# Patient Record
Sex: FEMALE | Race: WHITE | NOT HISPANIC OR LATINO | URBAN - METROPOLITAN AREA
[De-identification: names, ages, dates, MRNs, and addresses within clinical notes are randomized per-mention and may not be internally consistent; named-entity substitution may affect disease eponyms.]

---

## 2017-07-17 ENCOUNTER — INPATIENT (INPATIENT)
Facility: HOSPITAL | Age: 82
LOS: 5 days | Discharge: HOME | DRG: 287 | End: 2017-07-23
Attending: INTERNAL MEDICINE | Admitting: INTERNAL MEDICINE
Payer: MEDICARE

## 2017-07-17 VITALS
OXYGEN SATURATION: 96 % | DIASTOLIC BLOOD PRESSURE: 69 MMHG | HEART RATE: 82 BPM | SYSTOLIC BLOOD PRESSURE: 147 MMHG | RESPIRATION RATE: 16 BRPM

## 2017-07-17 DIAGNOSIS — E78.5 HYPERLIPIDEMIA, UNSPECIFIED: ICD-10-CM

## 2017-07-17 DIAGNOSIS — Z98.890 OTHER SPECIFIED POSTPROCEDURAL STATES: Chronic | ICD-10-CM

## 2017-07-17 DIAGNOSIS — I50.22 CHRONIC SYSTOLIC (CONGESTIVE) HEART FAILURE: ICD-10-CM

## 2017-07-17 DIAGNOSIS — C43.9 MALIGNANT MELANOMA OF SKIN, UNSPECIFIED: Chronic | ICD-10-CM

## 2017-07-17 DIAGNOSIS — I50.21 ACUTE SYSTOLIC (CONGESTIVE) HEART FAILURE: ICD-10-CM

## 2017-07-17 DIAGNOSIS — Z90.49 ACQUIRED ABSENCE OF OTHER SPECIFIED PARTS OF DIGESTIVE TRACT: Chronic | ICD-10-CM

## 2017-07-17 DIAGNOSIS — E03.9 HYPOTHYROIDISM, UNSPECIFIED: ICD-10-CM

## 2017-07-17 DIAGNOSIS — I49.5 SICK SINUS SYNDROME: ICD-10-CM

## 2017-07-17 DIAGNOSIS — I10 ESSENTIAL (PRIMARY) HYPERTENSION: ICD-10-CM

## 2017-07-17 DIAGNOSIS — I48.91 UNSPECIFIED ATRIAL FIBRILLATION: ICD-10-CM

## 2017-07-17 LAB
ALBUMIN SERPL ELPH-MCNC: 3.9 G/DL — SIGNIFICANT CHANGE UP (ref 3.3–5)
ALP SERPL-CCNC: 60 U/L — SIGNIFICANT CHANGE UP (ref 40–120)
ALT FLD-CCNC: 11 U/L — SIGNIFICANT CHANGE UP (ref 10–45)
ANION GAP SERPL CALC-SCNC: 17 MMOL/L — SIGNIFICANT CHANGE UP (ref 5–17)
AST SERPL-CCNC: 18 U/L — SIGNIFICANT CHANGE UP (ref 10–40)
BILIRUB SERPL-MCNC: 0.5 MG/DL — SIGNIFICANT CHANGE UP (ref 0.2–1.2)
BUN SERPL-MCNC: 38 MG/DL — HIGH (ref 7–23)
CALCIUM SERPL-MCNC: 8.8 MG/DL — SIGNIFICANT CHANGE UP (ref 8.4–10.5)
CHLORIDE SERPL-SCNC: 105 MMOL/L — SIGNIFICANT CHANGE UP (ref 96–108)
CK MB CFR SERPL CALC: 1.5 NG/ML — SIGNIFICANT CHANGE UP (ref 0–6.7)
CK SERPL-CCNC: 26 U/L — SIGNIFICANT CHANGE UP (ref 25–170)
CO2 SERPL-SCNC: 20 MMOL/L — LOW (ref 22–31)
CREAT SERPL-MCNC: 1.6 MG/DL — HIGH (ref 0.5–1.3)
GLUCOSE SERPL-MCNC: 108 MG/DL — HIGH (ref 70–99)
HBA1C BLD-MCNC: 5 % — SIGNIFICANT CHANGE UP (ref 4–5.6)
HCT VFR BLD CALC: 39.4 % — SIGNIFICANT CHANGE UP (ref 34.5–45)
HGB BLD-MCNC: 13 G/DL — SIGNIFICANT CHANGE UP (ref 11.5–15.5)
MAGNESIUM SERPL-MCNC: 2 MG/DL — SIGNIFICANT CHANGE UP (ref 1.6–2.6)
MCHC RBC-ENTMCNC: 31.5 PG — SIGNIFICANT CHANGE UP (ref 27–34)
MCHC RBC-ENTMCNC: 33 G/DL — SIGNIFICANT CHANGE UP (ref 32–36)
MCV RBC AUTO: 95.4 FL — SIGNIFICANT CHANGE UP (ref 80–100)
PLATELET # BLD AUTO: 184 K/UL — SIGNIFICANT CHANGE UP (ref 150–400)
POTASSIUM SERPL-MCNC: 4.1 MMOL/L — SIGNIFICANT CHANGE UP (ref 3.5–5.3)
POTASSIUM SERPL-SCNC: 4.1 MMOL/L — SIGNIFICANT CHANGE UP (ref 3.5–5.3)
PROT SERPL-MCNC: 6.5 G/DL — SIGNIFICANT CHANGE UP (ref 6–8.3)
RBC # BLD: 4.13 M/UL — SIGNIFICANT CHANGE UP (ref 3.8–5.2)
RBC # FLD: 13.3 % — SIGNIFICANT CHANGE UP (ref 10.3–16.9)
SODIUM SERPL-SCNC: 142 MMOL/L — SIGNIFICANT CHANGE UP (ref 135–145)
T4 AB SER-ACNC: 7.52 UG/DL — SIGNIFICANT CHANGE UP (ref 3.17–11.72)
TROPONIN T SERPL-MCNC: <0.01 NG/ML — SIGNIFICANT CHANGE UP (ref 0–0.01)
TSH SERPL-MCNC: 1.41 UIU/ML — SIGNIFICANT CHANGE UP (ref 0.35–4.94)
WBC # BLD: 4.7 K/UL — SIGNIFICANT CHANGE UP (ref 3.8–10.5)
WBC # FLD AUTO: 4.7 K/UL — SIGNIFICANT CHANGE UP (ref 3.8–10.5)

## 2017-07-17 PROCEDURE — 99223 1ST HOSP IP/OBS HIGH 75: CPT

## 2017-07-17 PROCEDURE — 93306 TTE W/DOPPLER COMPLETE: CPT | Mod: 26

## 2017-07-17 PROCEDURE — 99222 1ST HOSP IP/OBS MODERATE 55: CPT

## 2017-07-17 RX ORDER — PANTOPRAZOLE SODIUM 20 MG/1
40 TABLET, DELAYED RELEASE ORAL
Qty: 0 | Refills: 0 | Status: DISCONTINUED | OUTPATIENT
Start: 2017-07-17 | End: 2017-07-23

## 2017-07-17 RX ORDER — CALCIUM CARBONATE 500(1250)
3 TABLET ORAL
Qty: 0 | Refills: 0 | COMMUNITY

## 2017-07-17 RX ORDER — CITALOPRAM 10 MG/1
1 TABLET, FILM COATED ORAL
Qty: 0 | Refills: 0 | COMMUNITY

## 2017-07-17 RX ORDER — OMEPRAZOLE 10 MG/1
1 CAPSULE, DELAYED RELEASE ORAL
Qty: 0 | Refills: 0 | COMMUNITY

## 2017-07-17 RX ORDER — METOPROLOL TARTRATE 50 MG
100 TABLET ORAL DAILY
Qty: 0 | Refills: 0 | Status: DISCONTINUED | OUTPATIENT
Start: 2017-07-17 | End: 2017-07-20

## 2017-07-17 RX ORDER — SIMVASTATIN 20 MG/1
5 TABLET, FILM COATED ORAL AT BEDTIME
Qty: 0 | Refills: 0 | Status: DISCONTINUED | OUTPATIENT
Start: 2017-07-17 | End: 2017-07-23

## 2017-07-17 RX ORDER — PREGABALIN 225 MG/1
500 CAPSULE ORAL DAILY
Qty: 0 | Refills: 0 | Status: DISCONTINUED | OUTPATIENT
Start: 2017-07-17 | End: 2017-07-23

## 2017-07-17 RX ORDER — PREGABALIN 225 MG/1
1 CAPSULE ORAL
Qty: 0 | Refills: 0 | COMMUNITY

## 2017-07-17 RX ORDER — CHOLECALCIFEROL (VITAMIN D3) 125 MCG
2000 CAPSULE ORAL DAILY
Qty: 0 | Refills: 0 | Status: DISCONTINUED | OUTPATIENT
Start: 2017-07-17 | End: 2017-07-23

## 2017-07-17 RX ORDER — LEVOTHYROXINE SODIUM 125 MCG
1 TABLET ORAL
Qty: 0 | Refills: 0 | COMMUNITY

## 2017-07-17 RX ORDER — LEVOTHYROXINE SODIUM 125 MCG
75 TABLET ORAL DAILY
Qty: 0 | Refills: 0 | Status: DISCONTINUED | OUTPATIENT
Start: 2017-07-17 | End: 2017-07-23

## 2017-07-17 RX ORDER — ASPIRIN/CALCIUM CARB/MAGNESIUM 324 MG
325 TABLET ORAL ONCE
Qty: 0 | Refills: 0 | Status: COMPLETED | OUTPATIENT
Start: 2017-07-18 | End: 2017-07-18

## 2017-07-17 RX ORDER — FUROSEMIDE 40 MG
20 TABLET ORAL DAILY
Qty: 0 | Refills: 0 | Status: DISCONTINUED | OUTPATIENT
Start: 2017-07-17 | End: 2017-07-17

## 2017-07-17 RX ORDER — CLOPIDOGREL BISULFATE 75 MG/1
600 TABLET, FILM COATED ORAL ONCE
Qty: 0 | Refills: 0 | Status: COMPLETED | OUTPATIENT
Start: 2017-07-18 | End: 2017-07-18

## 2017-07-17 RX ORDER — EZETIMIBE 10 MG/1
1 TABLET ORAL
Qty: 0 | Refills: 0 | COMMUNITY

## 2017-07-17 RX ORDER — CITALOPRAM 10 MG/1
20 TABLET, FILM COATED ORAL DAILY
Qty: 0 | Refills: 0 | Status: DISCONTINUED | OUTPATIENT
Start: 2017-07-17 | End: 2017-07-23

## 2017-07-17 RX ORDER — CALCIUM CARBONATE 500(1250)
1 TABLET ORAL DAILY
Qty: 0 | Refills: 0 | Status: DISCONTINUED | OUTPATIENT
Start: 2017-07-17 | End: 2017-07-23

## 2017-07-17 RX ORDER — CHOLECALCIFEROL (VITAMIN D3) 125 MCG
2 CAPSULE ORAL
Qty: 0 | Refills: 0 | COMMUNITY

## 2017-07-17 RX ADMIN — Medication 1 TABLET(S): at 18:28

## 2017-07-17 RX ADMIN — SIMVASTATIN 5 MILLIGRAM(S): 20 TABLET, FILM COATED ORAL at 22:02

## 2017-07-17 RX ADMIN — Medication 20 MILLIGRAM(S): at 19:04

## 2017-07-17 NOTE — H&P ADULT - HISTORY OF PRESENT ILLNESS
SKELETON    84 yo female, PMHx afib, CHF, SSS no pacemaker presents to [facility] endorsing....     Pt is now being transferred to [facility] for cardiac cath on Tuesday 7/18/17. 84 yo female, former smoker, PMHx HTN, HLD, afib (on Eliquis) s/p failed DCCV in 1/2017, newly diagnosed systolic CHF (EF 25% at [facility]), hypothyroidism, newly diagnosed SSS no pacemaker presents to [facility] endorsing SOB with minimal exertion x 5 days. Denied chest pain, dizziness, palpitations, nausea. Pt was admitted for acute systolic CHF in the setting of afib with RVR. Pt was diuresed with IV Lasix. Pt now euvolemic. Pt was noted to have 2.8 second pause on telemetry and diagnosed with sick sinus syndrome. Pt had Echocardiogram which showed EF 25%. In light of pt's newly diagnosed systolic chf diagnosis and SSS pt transferred to [facility] on 7/1717 for further workup including formal Echocardiogram, right and left cardiac catheterization on 7/18/17 and EP evaluation. 84 yo female, former smoker, PMHx HTN, HLD, afib (on Eliquis) s/p failed DCCV in 1/2017, newly diagnosed systolic CHF (EF 25% at [facility]), hypothyroidism, newly diagnosed SSS no pacemaker presents to [facility] endorsing SOB with minimal exertion x 5 days. Denied chest pain, dizziness, palpitations, nausea. Pt was admitted for acute systolic CHF in the setting of afib with RVR. Pt was diuresed with IV Lasix. Pt now euvolemic. Pt was noted to have 2.8 second pause on telemetry and diagnosed with sick sinus syndrome. Pt had Echocardiogram which showed EF 25%. In light of pt's newly diagnosed systolic chf diagnosis and SSS pt transferred to [facility] on 7/1717 for further workup including formal Echocardiogram, right and left cardiac catheterization on 7/18/17 and EP evaluation.

## 2017-07-17 NOTE — H&P ADULT - PROBLEM SELECTOR PLAN 1
- SOB with minimal exertion and LE edema x 5 days  - Echo at [facility] showed EF 25%.   - Pt treated - SOB with minimal exertion and LE edema x 5 days  - Echo at [facility] showed EF 25%.   - Pt treated with Lasix 20mg IV daily. Will start on Lasix 20mg PO daily at [facility]. Pt not on Lasix at home.  -Pt precathed/consented for cardiac cath in AM with Dr. Stephen. Load for Aspirin 325mg and Plavix 600mg ordered for AM. Pt asymptomatic on arrival at [facility].

## 2017-07-17 NOTE — H&P ADULT - PROBLEM SELECTOR PLAN 5
-Pt only on Toprol XL 100mg daily and BP in 90s-110s. Inquire on whether or not to start ACE/ARB. On hold now 2/2 hypotension.

## 2017-07-17 NOTE — H&P ADULT - PROBLEM SELECTOR PLAN 2
-afib with rates between 30s-120s. Continue Toprol XL 100mg daily  -Eliquis 2.5mg BID held 2/2 cardiac cath on 7/18/17  -Dr. Ruelas consulted for EP

## 2017-07-17 NOTE — H&P ADULT - PROBLEM SELECTOR PLAN 6
-Pt takes Pravastatin 10mg and Zetia 10mg daily at home. Simvastatin 5mg started on house. Follow up lipid profile in AM

## 2017-07-17 NOTE — H&P ADULT - PMH
Acute systolic congestive heart failure    Afib    Hyperlipidemia    Hypertension    Hypothyroidism    Sick sinus syndrome

## 2017-07-17 NOTE — H&P ADULT - PROBLEM SELECTOR PLAN 3
-Pt had up to 3 second pause overnight while on cardizem drip, this was characterized as sick sinus syndrome  -EP Dr. Ruelas consulted and does not believe pt needs PPM at this time. Will continue to follow.

## 2017-07-17 NOTE — CONSULT NOTE ADULT - SUBJECTIVE AND OBJECTIVE BOX
HPI:  85 year old female with HTN, HLD, hypothyroidism and afib (on Eliquis) s/p failed DCCV in 1/2017, who was admitted to [facility] with SOB found to have newly depressed EF (25%), and is now transferred to [facility] for further management    Mrs. Brandon states she was newly diagnosed with atrial fibrillation in January during a routine doctors appointment.  She states she was asymptomatic but on further questioning she does endorse gradually increasing UNDERWOOD over the past year.  She states she was started on metoprolol and Eliquis and underwent a cardioversion and went back into atrial fibrillation shortly.  She denies any repeat cardioversions or being on an antiarrhythmic.  She states she lives 7 months in [place] and received care while there.  Her cardiologist here is Dr. Acosta.  She denies any palpitations, chest pain, fatigue, orthopnea or PND at baseline.  She states that 1 week prior to admission she started to experience orthopnea, PND, peripheral edema and SOB.  She was brought to [facility] with CHF exacerbation and atrial fibrillation with RVR.  She states that one week prior to her symptoms she had a UTI that was treated with no further symptoms.  She was diuresed and rate controlled.  She was on a cardizem drip and had pause up to 3 seconds during sleeping hours.  She was asymptomatic from this and denies any syncope or near syncope or lightheadedness.   She was found to have a newly depressed EF.  She is now transferred to [facility] for a cardiac catheterization and further EP evaluation.    PAST MEDICAL & SURGICAL HISTORY:  Acute systolic congestive heart failure  Sick sinus syndrome  Hypothyroidism  Hyperlipidemia  Hypertension  Afib  Malignant melanoma: s/p resection 1978  S/P removal of Zenker's diverticulum  S/P cholecystectomy      Social History: Denies smoking, drugs ETOH        pertinent home medications:  · 	Metoprolol Succinate  mg oral tablet, extended release: 1 tab(s) orally once a day, Last Dose Taken:    · 	Zetia 10 mg oral tablet: 1 tab(s) orally once a day, Last Dose Taken:    · 	Eliquis 2.5 mg oral tablet: 1 tab(s) orally 2 times a day, Last Dose Taken:    · 	pravastatin 10 mg oral tablet: 1 tab(s) orally once a day, Last Dose Taken:    · 	citalopram 20 mg oral tablet: 1 tab(s) orally once a day, Last Dose Taken:    · 	levothyroxine 75 mcg (0.075 mg) oral tablet: 1 tab(s) orally once a day, Last Dose Taken:    · 	omeprazole 20 mg oral delayed release capsule: 1 cap(s) orally once a day, Last Dose Taken:    · 	Vitamin D3 1000 intl units oral tablet: 2 tab(s) orally once a day, Last Dose Taken:    · 	Vitamin B12 500 mcg oral tablet: 1 tab(s) orally once a day, Last Dose Taken:    · 	calcium carbonate 400 mg oral tablet, chewable: 3 tab(s) orally once a day, Last Dose Taken:        No Known Allergies      ROS:   CONSTITUTIONAL: No fever, weight loss + fatigue  EYES: Pt denies  RESPIRATORY: No cough, wheezing, chills or hemoptysis; +Shrtness of Breath  CARDIOVASCULAR: see HPI  GASTROINTESTINAL: Pt denies  NEUROLOGICAL: Pt denies  SKIN: Pt denies   PSYCHIATRIC: Pt denies  HEME/LYMPH: Pt denies    PHYSICAL:  T(C): 35.6 (07-17-17 @ 14:25), Max: 35.6 (07-17-17 @ 14:25)  HR: 82 (07-17-17 @ 13:20) (82 - 82)  BP: 147/69 (07-17-17 @ 13:20) (147/69 - 147/69)  RR: 16 (07-17-17 @ 13:20) (16 - 16)  SpO2: 96% (07-17-17 @ 13:20) (96% - 96%)   Appearance: NAD  Cardiovascular: Irregularly irregular, normal rate  Resiratory: Lungs clear to auscultation   Gastrointestinal:  Soft, NT/ND,  	  Neurologic: A&O x 3          LABS:                        13.0   4.7   )-----------( 184      ( 17 Jul 2017 15:35 )             39.4     EKG: atrial fibrillation with a controlled ventricular rate (from OSH)    Telemetry:afib with a ventricular rate of 70-100bpm    ECHO: pending    Prior EP procedures: cardioversion 1/2016       Physician Assistant Assessment Plan: 85 year old female with HTN, HLD, hypothyroidism and afib (on Eliquis) s/p failed DCCV in 1/2017, who was admitted to [facility] with SOB found to have newly depressed EF (25%), and is now transferred to [facility] for further management HPI:  85 year old female with HTN, HLD, hypothyroidism and afib (on Eliquis) s/p failed DCCV in 1/2017, who was admitted to [facility] with SOB found to have newly depressed EF (25%), and is now transferred to [facility] for further management    Mrs. Brandon states she was newly diagnosed with atrial fibrillation in January during a routine doctors appointment.  She states she was asymptomatic but on further questioning she does endorse gradually increasing UNDERWOOD over the past year.  She states she was started on metoprolol and Eliquis and underwent a cardioversion and went back into atrial fibrillation shortly.  She denies any repeat cardioversions or being on an antiarrhythmic.  She states she lives 7 months in [place] and received care while there.  Her cardiologist here is Dr. Acosta.  She denies any palpitations, chest pain, fatigue, orthopnea or PND at baseline.  She states that 1 week prior to admission she started to experience orthopnea, PND, peripheral edema and SOB.  She was brought to [facility] with CHF exacerbation and atrial fibrillation with RVR.  She states that one week prior to her symptoms she had a UTI that was treated with no further symptoms.  She was diuresed and rate controlled.  She was on a cardizem drip and had pause up to 3 seconds during sleeping hours.  She was asymptomatic from this and denies any syncope or near syncope or lightheadedness.   She was found to have a newly depressed EF.  She is now transferred to [facility] for a cardiac catheterization and further EP evaluation.    PAST MEDICAL & SURGICAL HISTORY:  Acute systolic congestive heart failure  Sick sinus syndrome  Hypothyroidism  Hyperlipidemia  Hypertension  Afib  Malignant melanoma: s/p resection 1978  S/P removal of Zenker's diverticulum  S/P cholecystectomy      Social History: Denies smoking, drugs ETOH        pertinent home medications:  · 	Metoprolol Succinate  mg oral tablet, extended release: 1 tab(s) orally once a day, Last Dose Taken:    · 	Zetia 10 mg oral tablet: 1 tab(s) orally once a day, Last Dose Taken:    · 	Eliquis 2.5 mg oral tablet: 1 tab(s) orally 2 times a day, Last Dose Taken:    · 	pravastatin 10 mg oral tablet: 1 tab(s) orally once a day, Last Dose Taken:    · 	citalopram 20 mg oral tablet: 1 tab(s) orally once a day, Last Dose Taken:    · 	levothyroxine 75 mcg (0.075 mg) oral tablet: 1 tab(s) orally once a day, Last Dose Taken:    · 	omeprazole 20 mg oral delayed release capsule: 1 cap(s) orally once a day, Last Dose Taken:    · 	Vitamin D3 1000 intl units oral tablet: 2 tab(s) orally once a day, Last Dose Taken:    · 	Vitamin B12 500 mcg oral tablet: 1 tab(s) orally once a day, Last Dose Taken:    · 	calcium carbonate 400 mg oral tablet, chewable: 3 tab(s) orally once a day, Last Dose Taken:        No Known Allergies      ROS:   CONSTITUTIONAL: No fever, weight loss + fatigue  EYES: Pt denies  RESPIRATORY: No cough, wheezing, chills or hemoptysis; +Shrtness of Breath  CARDIOVASCULAR: see HPI  GASTROINTESTINAL: Pt denies  NEUROLOGICAL: Pt denies  SKIN: Pt denies   PSYCHIATRIC: Pt denies  HEME/LYMPH: Pt denies    PHYSICAL:  T(C): 35.6 (07-17-17 @ 14:25), Max: 35.6 (07-17-17 @ 14:25)  HR: 82 (07-17-17 @ 13:20) (82 - 82)  BP: 147/69 (07-17-17 @ 13:20) (147/69 - 147/69)  RR: 16 (07-17-17 @ 13:20) (16 - 16)  SpO2: 96% (07-17-17 @ 13:20) (96% - 96%)   Appearance: NAD  Cardiovascular: Irregularly irregular, normal rate  Resiratory: Lungs clear to auscultation   Gastrointestinal:  Soft, NT/ND,  	  Neurologic: A&O x 3          LABS:                        13.0   4.7   )-----------( 184      ( 17 Jul 2017 15:35 )             39.4     EKG: atrial fibrillation with a controlled ventricular rate (from OSH)    Telemetry:afib with a ventricular rate of 70-100bpm    ECHO: pending    Prior EP procedures: cardioversion 1/2016       Physician Assistant Assessment Plan: 85 year old female with HTN, HLD, hypothyroidism and afib (on Eliquis) s/p failed DCCV in 1/2017, who was admitted to [facility] with SOB found to have newly depressed EF (25%), and is now transferred to [facility] for further management.  Plan for cardiac catheterization tomorrow to rule out ischemic cause for cardiomyopathy.  Will follow up results but patient would likely benefit from amiodarone and repeat cardioversion given afib and depressed EF.  Check TSH and LFT.  After reviewing strips from OSH no current indication for pacemaker.  She has sleeping pauses in afib up to 3 seconds while on cardizem gtt.  Will continue to monitor telemetry.  Spoke about atrial fibrillation and cardiomyopathy in detail with patient.  She had no further questions or concerns.  We will follow up her cardiac catheterization and then make final recommendations.

## 2017-07-17 NOTE — H&P ADULT - ASSESSMENT
86 yo female, former smoker, PMHx HTN, HLD, afib (on Eliquis) s/p failed DCCV in 1/2017, newly diagnosed systolic CHF (EF 25% at [facility]), hypothyroidism, newly diagnosed SSS no pacemaker presents to [facility] endorsing SOB with minimal exertion x 5 days. Denied chest pain, dizziness, palpitations, nausea. Pt was admitted for acute systolic CHF in the setting of afib with RVR. Pt was diuresed with IV Lasix. Pt now euvolemic. Pt was noted to have 2.8 second pause on telemetry and diagnosed with sick sinus syndrome. Pt had Echocardiogram which showed EF 25%. In light of pt's newly diagnosed systolic chf diagnosis and SSS pt transferred to [facility] on 7/1717 for further workup including formal Echocardiogram, right and left cardiac catheterization on 7/18/17 and EP evaluation.

## 2017-07-18 LAB
ANION GAP SERPL CALC-SCNC: 13 MMOL/L — SIGNIFICANT CHANGE UP (ref 5–17)
APTT BLD: 33.1 SEC — SIGNIFICANT CHANGE UP (ref 27.5–37.4)
APTT BLD: >200 SEC — CRITICAL HIGH (ref 27.5–37.4)
BUN SERPL-MCNC: 36 MG/DL — HIGH (ref 7–23)
CALCIUM SERPL-MCNC: 8.8 MG/DL — SIGNIFICANT CHANGE UP (ref 8.4–10.5)
CHLORIDE SERPL-SCNC: 106 MMOL/L — SIGNIFICANT CHANGE UP (ref 96–108)
CHOLEST SERPL-MCNC: 109 MG/DL — SIGNIFICANT CHANGE UP (ref 10–199)
CO2 SERPL-SCNC: 24 MMOL/L — SIGNIFICANT CHANGE UP (ref 22–31)
CREAT SERPL-MCNC: 1.5 MG/DL — HIGH (ref 0.5–1.3)
GLUCOSE SERPL-MCNC: 99 MG/DL — SIGNIFICANT CHANGE UP (ref 70–99)
HCT VFR BLD CALC: 37.7 % — SIGNIFICANT CHANGE UP (ref 34.5–45)
HCT VFR BLD CALC: 37.9 % — SIGNIFICANT CHANGE UP (ref 34.5–45)
HDLC SERPL-MCNC: 36 MG/DL — LOW (ref 40–125)
HGB BLD-MCNC: 12 G/DL — SIGNIFICANT CHANGE UP (ref 11.5–15.5)
HGB BLD-MCNC: 12.4 G/DL — SIGNIFICANT CHANGE UP (ref 11.5–15.5)
INR BLD: 1.11 — SIGNIFICANT CHANGE UP (ref 0.88–1.16)
INR BLD: 1.24 — HIGH (ref 0.88–1.16)
LIPID PNL WITH DIRECT LDL SERPL: 44 MG/DL — SIGNIFICANT CHANGE UP
MAGNESIUM SERPL-MCNC: 1.9 MG/DL — SIGNIFICANT CHANGE UP (ref 1.6–2.6)
MCHC RBC-ENTMCNC: 30.8 PG — SIGNIFICANT CHANGE UP (ref 27–34)
MCHC RBC-ENTMCNC: 31.1 PG — SIGNIFICANT CHANGE UP (ref 27–34)
MCHC RBC-ENTMCNC: 31.8 G/DL — LOW (ref 32–36)
MCHC RBC-ENTMCNC: 32.7 G/DL — SIGNIFICANT CHANGE UP (ref 32–36)
MCV RBC AUTO: 95 FL — SIGNIFICANT CHANGE UP (ref 80–100)
MCV RBC AUTO: 96.9 FL — SIGNIFICANT CHANGE UP (ref 80–100)
PLATELET # BLD AUTO: 170 K/UL — SIGNIFICANT CHANGE UP (ref 150–400)
PLATELET # BLD AUTO: 187 K/UL — SIGNIFICANT CHANGE UP (ref 150–400)
POTASSIUM SERPL-MCNC: 3.9 MMOL/L — SIGNIFICANT CHANGE UP (ref 3.5–5.3)
POTASSIUM SERPL-SCNC: 3.9 MMOL/L — SIGNIFICANT CHANGE UP (ref 3.5–5.3)
PROTHROM AB SERPL-ACNC: 12.3 SEC — SIGNIFICANT CHANGE UP (ref 9.8–12.7)
PROTHROM AB SERPL-ACNC: 13.8 SEC — HIGH (ref 9.8–12.7)
RBC # BLD: 3.89 M/UL — SIGNIFICANT CHANGE UP (ref 3.8–5.2)
RBC # BLD: 3.99 M/UL — SIGNIFICANT CHANGE UP (ref 3.8–5.2)
RBC # FLD: 13.1 % — SIGNIFICANT CHANGE UP (ref 10.3–16.9)
RBC # FLD: 13.2 % — SIGNIFICANT CHANGE UP (ref 10.3–16.9)
SODIUM SERPL-SCNC: 143 MMOL/L — SIGNIFICANT CHANGE UP (ref 135–145)
T3 SERPL-MCNC: 80 NG/DL — SIGNIFICANT CHANGE UP (ref 80–200)
TOTAL CHOLESTEROL/HDL RATIO MEASUREMENT: 3 RATIO — LOW (ref 3.3–7.1)
TRIGL SERPL-MCNC: 147 MG/DL — SIGNIFICANT CHANGE UP (ref 10–149)
WBC # BLD: 4.7 K/UL — SIGNIFICANT CHANGE UP (ref 3.8–10.5)
WBC # BLD: 5.2 K/UL — SIGNIFICANT CHANGE UP (ref 3.8–10.5)
WBC # FLD AUTO: 4.7 K/UL — SIGNIFICANT CHANGE UP (ref 3.8–10.5)
WBC # FLD AUTO: 5.2 K/UL — SIGNIFICANT CHANGE UP (ref 3.8–10.5)

## 2017-07-18 PROCEDURE — 99233 SBSQ HOSP IP/OBS HIGH 50: CPT

## 2017-07-18 PROCEDURE — 93460 R&L HRT ART/VENTRICLE ANGIO: CPT | Mod: 26

## 2017-07-18 PROCEDURE — 99232 SBSQ HOSP IP/OBS MODERATE 35: CPT

## 2017-07-18 RX ORDER — MAGNESIUM OXIDE 400 MG ORAL TABLET 241.3 MG
400 TABLET ORAL ONCE
Qty: 0 | Refills: 0 | Status: COMPLETED | OUTPATIENT
Start: 2017-07-18 | End: 2017-07-18

## 2017-07-18 RX ORDER — POTASSIUM CHLORIDE 20 MEQ
20 PACKET (EA) ORAL ONCE
Qty: 0 | Refills: 0 | Status: COMPLETED | OUTPATIENT
Start: 2017-07-18 | End: 2017-07-18

## 2017-07-18 RX ORDER — HEPARIN SODIUM 5000 [USP'U]/ML
INJECTION INTRAVENOUS; SUBCUTANEOUS
Qty: 25000 | Refills: 0 | Status: DISCONTINUED | OUTPATIENT
Start: 2017-07-18 | End: 2017-07-18

## 2017-07-18 RX ORDER — HEPARIN SODIUM 5000 [USP'U]/ML
6500 INJECTION INTRAVENOUS; SUBCUTANEOUS ONCE
Qty: 0 | Refills: 0 | Status: COMPLETED | OUTPATIENT
Start: 2017-07-18 | End: 2017-07-18

## 2017-07-18 RX ORDER — HEPARIN SODIUM 5000 [USP'U]/ML
3000 INJECTION INTRAVENOUS; SUBCUTANEOUS EVERY 6 HOURS
Qty: 0 | Refills: 0 | Status: DISCONTINUED | OUTPATIENT
Start: 2017-07-18 | End: 2017-07-18

## 2017-07-18 RX ORDER — HEPARIN SODIUM 5000 [USP'U]/ML
6500 INJECTION INTRAVENOUS; SUBCUTANEOUS EVERY 6 HOURS
Qty: 0 | Refills: 0 | Status: DISCONTINUED | OUTPATIENT
Start: 2017-07-18 | End: 2017-07-18

## 2017-07-18 RX ORDER — APIXABAN 2.5 MG/1
2.5 TABLET, FILM COATED ORAL EVERY 12 HOURS
Qty: 0 | Refills: 0 | Status: DISCONTINUED | OUTPATIENT
Start: 2017-07-19 | End: 2017-07-23

## 2017-07-18 RX ADMIN — HEPARIN SODIUM 1500 UNIT(S)/HR: 5000 INJECTION INTRAVENOUS; SUBCUTANEOUS at 11:02

## 2017-07-18 RX ADMIN — Medication 75 MICROGRAM(S): at 06:17

## 2017-07-18 RX ADMIN — PREGABALIN 500 MICROGRAM(S): 225 CAPSULE ORAL at 11:28

## 2017-07-18 RX ADMIN — CITALOPRAM 20 MILLIGRAM(S): 10 TABLET, FILM COATED ORAL at 11:27

## 2017-07-18 RX ADMIN — Medication 20 MILLIEQUIVALENT(S): at 10:57

## 2017-07-18 RX ADMIN — MAGNESIUM OXIDE 400 MG ORAL TABLET 400 MILLIGRAM(S): 241.3 TABLET ORAL at 10:57

## 2017-07-18 RX ADMIN — CLOPIDOGREL BISULFATE 600 MILLIGRAM(S): 75 TABLET, FILM COATED ORAL at 06:17

## 2017-07-18 RX ADMIN — HEPARIN SODIUM 6500 UNIT(S): 5000 INJECTION INTRAVENOUS; SUBCUTANEOUS at 10:56

## 2017-07-18 RX ADMIN — Medication 2000 UNIT(S): at 11:28

## 2017-07-18 RX ADMIN — Medication 1 TABLET(S): at 11:28

## 2017-07-18 RX ADMIN — SIMVASTATIN 5 MILLIGRAM(S): 20 TABLET, FILM COATED ORAL at 23:35

## 2017-07-18 RX ADMIN — Medication 100 MILLIGRAM(S): at 06:17

## 2017-07-18 RX ADMIN — PANTOPRAZOLE SODIUM 40 MILLIGRAM(S): 20 TABLET, DELAYED RELEASE ORAL at 06:17

## 2017-07-18 RX ADMIN — Medication 325 MILLIGRAM(S): at 06:17

## 2017-07-18 NOTE — PROGRESS NOTE ADULT - PROBLEM SELECTOR PLAN 2
- HR 84-97 afib  - continue Toprol XL 100mg QD for rate control and heparin gtt for now for stroke prevention (eliquis on hold for cath today)

## 2017-07-18 NOTE — PROGRESS NOTE ADULT - SUBJECTIVE AND OBJECTIVE BOX
EPS Progress Note    S: offers no complaints. resting comfortably in bed.   T(C): 36.6 (07-18-17 @ 16:02), Max: 36.6 (07-18-17 @ 16:02)  HR: 89 (07-18-17 @ 12:56) (74 - 97)  BP: 128/103 (07-18-17 @ 12:56) (93/59 - 154/72)  RR: 16 (07-18-17 @ 12:56) (16 - 17)  SpO2: 98% (07-18-17 @ 12:56) (96% - 98%)  Wt(kg): --     Telemetry: AF with CVR, no significant bradycardia           General:  NAD         Chest:  CTA B/L         Cardiac:  irreg irreg s1s2    Abdomen:  +BS      Soft      Extremities: No LE Edema       Labs:                                                               12.0   4.7   )-----------( 170      ( 18 Jul 2017 06:12 )             37.7     07-18    143  |  106  |  36<H>  ----------------------------<  99  3.9   |  24  |  1.50<H>    Ca    8.8      18 Jul 2017 06:12  Mg     1.9     07-18    TPro  6.5  /  Alb  3.9  /  TBili  0.5  /  DBili  x   /  AST  18  /  ALT  11  /  AlkPhos  60  07-17    PT/INR - ( 18 Jul 2017 06:12 )   PT: 12.3 sec;   INR: 1.11          PTT - ( 18 Jul 2017 06:12 )  PTT:33.1 sec    Assessment/Plan:    85 year old female with HTN, HLD, hypothyroidism and afib (on Eliquis) s/p failed DCCV in 1/2017, who was admitted to [facility] with SOB found to have newly depressed EF (25%), and is now transferred to [facility] for further management.  Plan for cardiac catheterization today to rule out ischemic cause for cardiomyopathy.  Will follow up results but patient would likely benefit from amiodarone and repeat cardioversion given afib and depressed EF.  Well rate controlled on Toprol  daily without bradycardia noted.  -	Recommend Amiodarone 400 mg BID x 10 days then 200 mg daily thereafter  -	Continue Heparin as you are; resume NOAC post catheterization  -	Plan for elective cardioversion 7/20/17  -	Will f/u cath results

## 2017-07-18 NOTE — PROGRESS NOTE ADULT - SUBJECTIVE AND OBJECTIVE BOX
Interventional Cardiology PA Adult Progress Note    Subjective Assessment: Patient was seen and examined this AM and reports she is feeling well. Denies CP or SOB.  	  MEDICATIONS:  metoprolol succinate  milliGRAM(s) Oral daily  citalopram 20 milliGRAM(s) Oral daily  pantoprazole    Tablet 40 milliGRAM(s) Oral before breakfast  simvastatin 5 milliGRAM(s) Oral at bedtime  levothyroxine 75 MICROGram(s) Oral daily  calcium carbonate 1250 mG (OsCal) 1 Tablet(s) Oral daily  cyanocobalamin 500 MICROGram(s) Oral daily  cholecalciferol 2000 Unit(s) Oral daily	    [PHYSICAL EXAM:  TELEMETRY:  T(C): 35.7 (07-18-17 @ 12:32), Max: 36.1 (07-17-17 @ 21:31)  HR: 89 (07-18-17 @ 12:56) (74 - 97)  BP: 128/103 (07-18-17 @ 12:56) (93/59 - 154/72)  RR: 16 (07-18-17 @ 12:56) (16 - 17)  SpO2: 98% (07-18-17 @ 12:56) (96% - 98%)  Wt(kg): --  I&O's Summary    17 Jul 2017 07:01  -  18 Jul 2017 07:00  --------------------------------------------------------  IN: 660 mL / OUT: 2100 mL / NET: -1440 mL    18 Jul 2017 07:01  -  18 Jul 2017 13:40  --------------------------------------------------------  IN: 180 mL / OUT: 0 mL / NET: 180 mL      Height (cm): 160.02 (07-17 @ 14:25)  Weight (kg): 81.8 (07-17 @ 14:25)  BMI (kg/m2): 31.9 (07-17 @ 14:25)  BSA (m2): 1.85 (07-17 @ 14:25)    Appearance: Normal	  HEENT:   Normal oral mucosa, PERRL, EOMI	  Neck: Supple, - JVD; No Carotid Bruit   Cardiovascular: Normal S1 S2, No JVD, No murmurs  Respiratory: Lungs clear to auscultation, No Rales, Rhonchi, Wheezing	  Gastrointestinal:  Soft, Non-tender, + BS	  Skin: No rashes, No ecchymoses, No cyanosis  Extremities: Normal range of motion, No clubbing, cyanosis or edema  Vascular: Peripheral pulses palpable 2+ bilaterally  Neurologic: Non-focal  Psychiatry: A & O x 3, Mood & affect appropriate  	    LABS:	 	  CARDIAC MARKERS:                          12.0   4.7   )-----------( 170      ( 18 Jul 2017 06:12 )             37.7     07-18    143  |  106  |  36<H>  ----------------------------<  99  3.9   |  24  |  1.50<H>    Ca    8.8      18 Jul 2017 06:12  Mg     1.9     07-18    TPro  6.5  /  Alb  3.9  /  TBili  0.5  /  DBili  x   /  AST  18  /  ALT  11  /  AlkPhos  60  07-17    HgA1c: Hemoglobin A1C, Whole Blood: 5.0 % (07-17 @ 15:35)    TSH: Thyroid Stimulating Hormone, Serum: 1.412 uIU/mL (07-17 @ 15:35)    PT/INR - ( 18 Jul 2017 06:12 )   PT: 12.3 sec;   INR: 1.11          PTT - ( 18 Jul 2017 06:12 )  PTT:33.1 sec

## 2017-07-18 NOTE — PROGRESS NOTE ADULT - ASSESSMENT
Patient is an 86yo F, former smoker, with PMHx HTN, HLD, Afib (on Eliquis) s/p failed DCCV in 1/2017 and hypothyroidism who presented to [facility] complaining of UNDERWOOD and was found to be in new acute systolic CHF exacerbation (EF 25% by echo there) in the setting of rapid afib. Patient was diuresed with IV lasix and is now euvolemic. Additionally, patient had 2.8 second pause on telemetry and was diagnosed with sick sinus syndrome. In light of pt's newly diagnosed systolic CHF and SSS pt was transferred to [facility] on 7/1717 for further workup including formal Echocardiogram, right and left cardiac catheterization on 7/18/17 and EP evaluation.

## 2017-07-18 NOTE — PROGRESS NOTE ADULT - PROBLEM SELECTOR PLAN 6
- Pt takes Pravastatin 10mg and Zetia 10mg daily at home but simvastatin 5mg QD ordered here as home meds not on formulary; lipid panel wnl    DISPO: Pending right and left cardiac cath today and EP recs

## 2017-07-18 NOTE — PROGRESS NOTE ADULT - PROBLEM SELECTOR PLAN 1
- Patient euvolemic this AM; continue daily weights and I&Os  - Echo 7/17/17 revealed dyskinetic septum with moderate to severe hypokinesis of the remaining regions, LVEF 20-25%, RV free wall is hypokinetic, mild MR, trace TR  - Plan for right and left cardiac cath today with Dr. Stephen

## 2017-07-18 NOTE — PROGRESS NOTE ADULT - SUBJECTIVE AND OBJECTIVE BOX
84 yo female, former smoker, PMHx HTN, HLD, afib (on Eliquis) s/p failed DCCV in 1/2017, newly diagnosed systolic CHF (EF 25% at [facility]), hypothyroidism, newly diagnosed SSS no pacemaker presents to [facility] endorsing SOB with minimal exertion x 5 days. Denied chest pain, dizziness, palpitations, nausea. Pt was admitted for acute systolic CHF in the setting of afib with RVR. Pt was diuresed with IV Lasix. Pt now euvolemic. Pt was noted to have 2.8 second pause on telemetry and diagnosed with sick sinus syndrome. Pt had Echocardiogram which showed EF 25%. In light of pt's newly diagnosed systolic chf diagnosis and SSS pt transferred to [facility] on 7/1717 for further workup including formal Echocardiogram, right and left cardiac catheterization on 7/18/17 and EP evaluation.        Patient was seen and examined this AM and reports she is feeling well.      MEDICATIONS:  metoprolol succinate  milliGRAM(s) Oral daily  citalopram 20 milliGRAM(s) Oral daily  pantoprazole    Tablet 40 milliGRAM(s) Oral before breakfast  simvastatin 5 milliGRAM(s) Oral at bedtime  levothyroxine 75 MICROGram(s) Oral daily  calcium carbonate 1250 mG (OsCal) 1 Tablet(s) Oral daily  cyanocobalamin 500 MICROGram(s) Oral daily  cholecalciferol 2000 Unit(s) Oral daily	    ICU Vital Signs Last 24 Hrs  T(C): 36.6 (18 Jul 2017 21:13), Max: 36.6 (18 Jul 2017 16:02)  T(F): 97.9 (18 Jul 2017 21:13), Max: 97.9 (18 Jul 2017 21:13)  HR: 80 (18 Jul 2017 18:17) (74 - 97)  BP: 116/60 (18 Jul 2017 18:17) (102/60 - 128/103)  BP(mean): 80 (18 Jul 2017 18:17) (80 - 113)  ABP: --  ABP(mean): --  RR: 16 (18 Jul 2017 18:17) (16 - 17)  SpO2: 97% (18 Jul 2017 18:17) (96% - 98%)          17 Jul 2017 07:01  -  18 Jul 2017 07:00  --------------------------------------------------------  IN: 660 mL / OUT: 2100 mL / NET: -1440 mL    18 Jul 2017 07:01  -  18 Jul 2017 13:40  --------------------------------------------------------  IN: 180 mL / OUT: 0 mL / NET: 180 mL      Height (cm): 160.02 (07-17 @ 14:25)  Weight (kg): 81.8 (07-17 @ 14:25)  BMI (kg/m2): 31.9 (07-17 @ 14:25)  BSA (m2): 1.85 (07-17 @ 14:25)    Appearance:NAD  HEENT:   Normal oral mucosa, PERRL, EOMI	  Neck: Supple, - JVD; No Carotid Bruit   Cardiovascular: Normal S1 S2, No JVD, No murmurs  Respiratory: Lungs clear to auscultation, No Rales, Rhonchi, Wheezing	  Gastrointestinal:  Soft, Non-tender, + BS	  Skin: No rashes, No ecchymoses, No cyanosis  Extremities: Normal range of motion, No clubbing, cyanosis or edema  Vascular: Peripheral pulses palpable 2+ bilaterally  Neurologic: Non-focal  Psychiatry: A & O x 3,	    LABS:	 	  CARDIAC MARKERS:                          12.0   4.7   )-----------( 170      ( 18 Jul 2017 06:12 )             37.7     07-18    143  |  106  |  36<H>  ----------------------------<  99  3.9   |  24  |  1.50<H>    Ca    8.8      18 Jul 2017 06:12  Mg     1.9     07-18    TPro  6.5  /  Alb  3.9  /  TBili  0.5  /  DBili  x   /  AST  18  /  ALT  11  /  AlkPhos  60  07-17    HgA1c: Hemoglobin A1C, Whole Blood: 5.0 % (07-17 @ 15:35)    TSH: Thyroid Stimulating Hormone, Serum: 1.412 uIU/mL (07-17 @ 15:35)    PT/INR - ( 18 Jul 2017 06:12 )   PT: 12.3 sec;   INR: 1.11          PTT - ( 18 Jul 2017 06:12 )  PTT:33.1 sec

## 2017-07-18 NOTE — PROGRESS NOTE ADULT - ASSESSMENT
Patient is an 86yo female, former smoker, with PMHx HTN, HLD, Afib (on Eliquis) s/p failed DCCV in 1/2017, newly diagnosed systolic CHF (EF 25% at [facility]), hypothyroidism, newly diagnosed SSS no pacemaker presents to [facility] endorsing SOB with minimal exertion x 5 days. Denied chest pain, dizziness, palpitations, nausea. Pt was admitted for acute systolic CHF in the setting of afib with RVR. Pt was diuresed with IV Lasix. Pt now euvolemic. Pt was noted to have 2.8 second pause on telemetry and diagnosed with sick sinus syndrome. Pt had Echocardiogram which showed EF 25%. In light of pt's newly diagnosed systolic chf diagnosis and SSS pt transferred to [facility] on 7/1717 for further workup including formal Echocardiogram, right and left cardiac catheterization on 7/18/17 and EP evaluation. Patient is an 86yo F, former smoker, with PMHx HTN, HLD, Afib (on Eliquis) s/p failed DCCV in 1/2017 and hypothyroidism who presented to [facility] complaining of UNDERWOOD and was found to be in new acute systolic CHF exacerbation (EF 25% by echo there) in the setting of rapid afib. Patient was diuresed with IV lasix and is now euvolemic. Additionally, patient had 2.8 second pause on telemetry and was diagnosed with sick sinus syndrome. In light of pt's newly diagnosed systolic CHF and SSS pt was transferred to [facility] on 7/1717 for further workup including formal Echocardiogram, right and left cardiac catheterization on 7/18/17 and EP evaluation.

## 2017-07-18 NOTE — PROGRESS NOTE ADULT - PROBLEM SELECTOR PLAN 3
EP Dr. Ruelas following  - Patient had 3 second pause at OSH while on cardizem gtt for which they dx SSS, per Dr. Ruelas unlikely; will make final recs post cath but does not think pt needs PPM  - no significant pauses noted overnight on telemetry

## 2017-07-19 LAB
ANION GAP SERPL CALC-SCNC: 14 MMOL/L — SIGNIFICANT CHANGE UP (ref 5–17)
BUN SERPL-MCNC: 38 MG/DL — HIGH (ref 7–23)
CALCIUM SERPL-MCNC: 8.5 MG/DL — SIGNIFICANT CHANGE UP (ref 8.4–10.5)
CHLORIDE SERPL-SCNC: 108 MMOL/L — SIGNIFICANT CHANGE UP (ref 96–108)
CO2 SERPL-SCNC: 21 MMOL/L — LOW (ref 22–31)
CREAT SERPL-MCNC: 1.4 MG/DL — HIGH (ref 0.5–1.3)
GLUCOSE SERPL-MCNC: 96 MG/DL — SIGNIFICANT CHANGE UP (ref 70–99)
HCT VFR BLD CALC: 35.4 % — SIGNIFICANT CHANGE UP (ref 34.5–45)
HGB BLD-MCNC: 11.5 G/DL — SIGNIFICANT CHANGE UP (ref 11.5–15.5)
MAGNESIUM SERPL-MCNC: 2 MG/DL — SIGNIFICANT CHANGE UP (ref 1.6–2.6)
MCHC RBC-ENTMCNC: 31.1 PG — SIGNIFICANT CHANGE UP (ref 27–34)
MCHC RBC-ENTMCNC: 32.5 G/DL — SIGNIFICANT CHANGE UP (ref 32–36)
MCV RBC AUTO: 95.7 FL — SIGNIFICANT CHANGE UP (ref 80–100)
PLATELET # BLD AUTO: 162 K/UL — SIGNIFICANT CHANGE UP (ref 150–400)
POTASSIUM SERPL-MCNC: 3.9 MMOL/L — SIGNIFICANT CHANGE UP (ref 3.5–5.3)
POTASSIUM SERPL-SCNC: 3.9 MMOL/L — SIGNIFICANT CHANGE UP (ref 3.5–5.3)
RBC # BLD: 3.7 M/UL — LOW (ref 3.8–5.2)
RBC # FLD: 13.1 % — SIGNIFICANT CHANGE UP (ref 10.3–16.9)
SODIUM SERPL-SCNC: 143 MMOL/L — SIGNIFICANT CHANGE UP (ref 135–145)
WBC # BLD: 5.4 K/UL — SIGNIFICANT CHANGE UP (ref 3.8–10.5)
WBC # FLD AUTO: 5.4 K/UL — SIGNIFICANT CHANGE UP (ref 3.8–10.5)

## 2017-07-19 PROCEDURE — 99233 SBSQ HOSP IP/OBS HIGH 50: CPT

## 2017-07-19 PROCEDURE — 99232 SBSQ HOSP IP/OBS MODERATE 35: CPT

## 2017-07-19 RX ORDER — AMIODARONE HYDROCHLORIDE 400 MG/1
400 TABLET ORAL
Qty: 0 | Refills: 0 | Status: DISCONTINUED | OUTPATIENT
Start: 2017-07-19 | End: 2017-07-23

## 2017-07-19 RX ORDER — LISINOPRIL 2.5 MG/1
10 TABLET ORAL DAILY
Qty: 0 | Refills: 0 | Status: DISCONTINUED | OUTPATIENT
Start: 2017-07-19 | End: 2017-07-21

## 2017-07-19 RX ORDER — POTASSIUM CHLORIDE 20 MEQ
40 PACKET (EA) ORAL ONCE
Qty: 0 | Refills: 0 | Status: COMPLETED | OUTPATIENT
Start: 2017-07-19 | End: 2017-07-19

## 2017-07-19 RX ADMIN — APIXABAN 2.5 MILLIGRAM(S): 2.5 TABLET, FILM COATED ORAL at 06:25

## 2017-07-19 RX ADMIN — AMIODARONE HYDROCHLORIDE 400 MILLIGRAM(S): 400 TABLET ORAL at 21:20

## 2017-07-19 RX ADMIN — CITALOPRAM 20 MILLIGRAM(S): 10 TABLET, FILM COATED ORAL at 13:20

## 2017-07-19 RX ADMIN — Medication 75 MICROGRAM(S): at 06:25

## 2017-07-19 RX ADMIN — Medication 40 MILLIEQUIVALENT(S): at 13:20

## 2017-07-19 RX ADMIN — Medication 100 MILLIGRAM(S): at 06:24

## 2017-07-19 RX ADMIN — Medication 2000 UNIT(S): at 13:20

## 2017-07-19 RX ADMIN — Medication 1 TABLET(S): at 13:20

## 2017-07-19 RX ADMIN — SIMVASTATIN 5 MILLIGRAM(S): 20 TABLET, FILM COATED ORAL at 21:20

## 2017-07-19 RX ADMIN — APIXABAN 2.5 MILLIGRAM(S): 2.5 TABLET, FILM COATED ORAL at 18:14

## 2017-07-19 RX ADMIN — LISINOPRIL 10 MILLIGRAM(S): 2.5 TABLET ORAL at 18:14

## 2017-07-19 RX ADMIN — PREGABALIN 500 MICROGRAM(S): 225 CAPSULE ORAL at 13:20

## 2017-07-19 RX ADMIN — AMIODARONE HYDROCHLORIDE 400 MILLIGRAM(S): 400 TABLET ORAL at 09:30

## 2017-07-19 RX ADMIN — PANTOPRAZOLE SODIUM 40 MILLIGRAM(S): 20 TABLET, DELAYED RELEASE ORAL at 06:25

## 2017-07-19 NOTE — PROGRESS NOTE ADULT - PROBLEM SELECTOR PLAN 1
- Patient euvolemic this AM; continue daily weights and I&Os  - Echo 7/17/17 revealed dyskinetic septum with moderate to severe hypokinesis of the remaining regions, LVEF 20-25%, RV free wall is hypokinetic, mild MR, trace TR  - Cardiac cath 7/18 revealed non-obstructive CAD with normal Right Heart Pressures.   -Ramipril 2.5 mg PO Daily initiated - Patient euvolemic this AM; continue daily weights and I&Os  - Echo 7/17/17 revealed dyskinetic septum with moderate to severe hypokinesis of the remaining regions, LVEF 20-25%, RV free wall is hypokinetic, mild MR, trace TR  - Cardiac cath 7/18 revealed non-obstructive CAD with normal Right Heart Pressures.   -Lisinopril 10 mg PO Daily ordered (equivalent to Ramipril 2.5 mg PO Daily). - Patient euvolemic this AM. Patient diuresed at [facility], continue daily weights and I & Os  - Echo 7/17/17 revealed dyskinetic septum with moderate to severe hypokinesis of the remaining regions, LVEF 20-25%, RV free wall is hypokinetic, mild MR, trace TR  - Cardiac cath 7/18 revealed non-obstructive CAD with normal Right Heart Pressures.   -Lisinopril 10 mg PO Daily ordered (equivalent to Ramipril 2.5 mg PO Daily).

## 2017-07-19 NOTE — PROGRESS NOTE ADULT - PROBLEM SELECTOR PLAN 3
JESÚS Ruelas following  - Patient had 3 second pause at OSH while on cardizem gtt for which they dx SSS, per Dr. Ruelas unlikely patient needs PPM.   - no significant pauses noted overnight on telemetry

## 2017-07-19 NOTE — CONSULT NOTE ADULT - SUBJECTIVE AND OBJECTIVE BOX
CHIEF COMPLAINT: CHF    HISTORY OF PRESENT ILLNESS:  86 yo female, former smoker, PMHx HTN, HLD, afib (on Eliquis) s/p failed DCCV in 1/2017, newly diagnosed systolic CHF (EF 25% at [facility]), hypothyroidism, newly diagnosed SSS no pacemaker presents to [facility] endorsing SOB with minimal exertion x 5 days. Denied chest pain, dizziness, palpitations, nausea. Pt was admitted for acute systolic CHF in the setting of afib with RVR. Pt was diuresed with IV Lasix. Pt now euvolemic. Pt was noted to have 2.8 second pause on telemetry and diagnosed with sick sinus syndrome. Pt had Echocardiogram which showed EF 25%. In light of pt's newly diagnosed systolic chf diagnosis and SSS pt transferred to [facility] on 7/1717 for further workup including formal Echocardiogram, right and left cardiac catheterization on 7/18/17 and EP evaluation.     PAST MEDICAL & SURGICAL HISTORY:  Acute systolic congestive heart failure  Sick sinus syndrome  Hypothyroidism  Hyperlipidemia  Hypertension  Afib  Malignant melanoma: s/p resection 1978  S/P removal of Zenker's diverticulum  S/P cholecystectomy    FAMILY HISTORY: Unknown.     ALLERGIES: NKDA    HOME MEDICATIONS:  · 	Metoprolol Succinate  mg oral tablet, extended release: 1 tab(s) orally once a day, Last Dose Taken:    · 	Zetia 10 mg oral tablet: 1 tab(s) orally once a day, Last Dose Taken:    · 	Eliquis 2.5 mg oral tablet: 1 tab(s) orally 2 times a day, Last Dose Taken:    · 	pravastatin 10 mg oral tablet: 1 tab(s) orally once a day, Last Dose Taken:    · 	citalopram 20 mg oral tablet: 1 tab(s) orally once a day, Last Dose Taken:    · 	levothyroxine 75 mcg (0.075 mg) oral tablet: 1 tab(s) orally once a day, Last Dose Taken:    · 	omeprazole 20 mg oral delayed release capsule: 1 cap(s) orally once a day, Last Dose Taken:    · 	Vitamin D3 1000 intl units oral tablet: 2 tab(s) orally once a day, Last Dose Taken:    · 	Vitamin B12 500 mcg oral tablet: 1 tab(s) orally once a day, Last Dose Taken:    · 	calcium carbonate 400 mg oral tablet, chewable: 3 tab(s) orally once a day, Last Dose Taken:      PHYSICAL EXAM:  T(C): 36.1 (07-19-17 @ 15:52), Max: 36.6 (07-18-17 @ 21:13)  T(F): 97 (07-19-17 @ 15:52), Max: 97.9 (07-18-17 @ 21:13)  HR: 89 (07-19-17 @ 14:30) (78 - 110)  BP: 128/66 (07-19-17 @ 14:30) (92/71 - 128/66)  RR: 18 (07-19-17 @ 13:20) (16 - 18)  SpO2: 98% (07-19-17 @ 06:22) (94% - 100%)  Height (cm): 160.02 (07-17 @ 14:25)  Weight (kg): 81.8 (07-17 @ 14:25)  BMI (kg/m2): 31.9 (07-17 @ 14:25)  	  LABS:	                          11.5   5.4   )-----------( 162      ( 19 Jul 2017 06:20 )             35.4     07-19    143  |  108  |  38<H>  ----------------------------<  96  3.9   |  21<L>  |  1.40<H>    Ca    8.5      19 Jul 2017 06:20  Mg     2.0     07-19      Cholesterol, Serum: 109 mg/dL (07-18 @ 06:12)  HDL Cholesterol, Serum: 36 mg/dL (07-18 @ 06:12)  Triglycerides, Serum: 147 mg/dL (07-18 @ 06:12)  Direct LDL: 44 mg/dL (07-18 @ 06:12)      Hemoglobin A1C, Whole Blood: 5.0 % (07-17 @ 15:35)      10 "S" ASSESSMENT PLAN: SMOKING, SITTING, SUGAR, SALT, SOME FATS, SOCIAL, SLEEP, SIGNS, AND MEDS:  Tobacco usage: She is a former smoker who quit in 1978.   Stress: Patient rates her stress level as low. She is currently being weaned off of Celexa.   ETOH: Patient drinks 2 glasses of wine daily.   Caffeine: She drinks decaf coffee with 2% milk and tea.   Hormone Replacement: Denies.   Sleep Disorder: She does report snoring. She has not been tested for sleep apnea.   Inflammatory Condition: Denies.   Activity Level: Sedentary.   Current Diet: Breakfast) eggs with toast and butter. Lunch) leftovers from dinner. Dinner) burger or lamb or chicken with vegetables and a baked potato. Snacks) potato chips and chocolate. She says she usually salts her food during cooking.   Heart Failure: + tachy-induced new onset sCHF.   Myopathy with Statins: Denies. She take pravastatin 10mg/d at home.   GI/ Issues: She has GERD and takes omeprazole at home and reports having had an EGD.     ASSESSMENT/RECOMMENDATIONS: 	  Summary: 86 yo female, former smoker, PMHx HTN, HLD, afib (on Eliquis) s/p failed DCCV in 1/2017, newly diagnosed systolic CHF (EF 25% at [facility]), hypothyroidism, newly diagnosed SSS no pacemaker presents to [facility] endorsing SOB with minimal exertion x 5 days. Denied chest pain, dizziness, palpitations, nausea. Pt was admitted for acute systolic CHF in the setting of afib with RVR. Pt was diuresed with IV Lasix. Pt now euvolemic. Pt was noted to have 2.8 second pause on telemetry and diagnosed with sick sinus syndrome. Pt had Echocardiogram which showed EF 25%. In light of pt's newly diagnosed systolic chf diagnosis and SSS pt transferred to [facility] on 7/1717 for further workup including formal Echocardiogram, right and left cardiac catheterization on 7/18/17 and EP evaluation.       RECOMMENDATIONS:   Anti-platelet Therapy: Patient is being anticoagulated with heparin due to AF.   Lipid Therapy: Lipid guidelines are less clear after age 75, per your discretion it would likely be prudent to continue pravastatin. Evaluation of a lipid panel is suggested.   Beta Blocker Therapy: Continue current therapy with metoprolol per your discretion.   ACE/ARB Therapy: Per your discretion.   Diet: Low-sodium, low-carbohydrate, Mediterranean diet. Written instruction on the Mediterranean diet was provided. We specifically discussed the importance of a low-sodium diet with heart failure. We discussed sources of sodium in her current diet and alternatives to salt. She is encouraged to monitor her energy and activity levels as well as keep track of her weights daily. She will report changes to her provider.   Exercise: Cardiac rehab would likely benefit this patient. She is currently residing with her daughter in [place] but will return home to [place] in the fall.   Smoking: This patient is a non-smoker.   Stress Management: Instruction on mindfulness meditation was provided.   Sleep: A sleep study would likely benefit this patient given her recent diagnosis of AF along with snoring and obesity.   Other: Patient was encouraged to stop drinking alcohol daily and keep consumption to no more than one glass per day. We also discussed limiting dairy given her history of acid reflux.     Thank you for the opportunity to see this patient. Please feel free to contact [facility] if there are any questions, or if you feel that your patient would benefit from continued follow-up visits with the Program.

## 2017-07-19 NOTE — PROGRESS NOTE ADULT - PROBLEM SELECTOR PLAN 5
- SBP labile 100-120's; continue Toprol XL 100mg PO Daily.  Lisinopril 10 mg PO Daily ordered (equivalent to Ramipril 2.5 mg PO Daily).  Monitor BP closely. Given EF 20-25%

## 2017-07-19 NOTE — PROGRESS NOTE ADULT - PROBLEM SELECTOR PLAN 5
- SBP labile 100-120's; continue Toprol XL 100mg PO Daily. Ramipril 2.5 mg PO Daily initiated. Monitor BP closely. Given EF 20-25% - SBP labile 100-120's; continue Toprol XL 100mg PO Daily.  Lisinopril 10 mg PO Daily ordered (equivalent to Ramipril 2.5 mg PO Daily).  Monitor BP closely. Given EF 20-25%

## 2017-07-19 NOTE — PROGRESS NOTE ADULT - PROBLEM SELECTOR PLAN 6
- Pt takes Pravastatin 10mg and Zetia 10mg daily at home but simvastatin 5mg QD ordered here as home meds not on formulary; lipid panel wnl    DVT Prophylaxis: Eliquis  Dispo: Patient currently stable awaiting DCCV tomorrow. NPO after midnight. Physical Therapy evaluated and recommends no home PT needs.

## 2017-07-19 NOTE — PROGRESS NOTE ADULT - SUBJECTIVE AND OBJECTIVE BOX
86 yo female, former smoker, PMHx HTN, HLD, afib (on Eliquis) s/p failed DCCV in 1/2017, newly diagnosed systolic CHF (EF 25% at [facility]), hypothyroidism, newly diagnosed SSS no pacemaker presents to [facility] endorsing SOB with minimal exertion x 5 days. Denied chest pain, dizziness, palpitations, nausea. Pt was admitted for acute systolic CHF in the setting of afib with RVR. Pt was diuresed with IV Lasix. Pt now euvolemic. Pt was noted to have 2.8 second pause on telemetry and diagnosed with sick sinus syndrome. Pt had Echocardiogram which showed EF 25%. In light of pt's newly diagnosed systolic chf diagnosis and SSS pt transferred to [facility] on 7/1717 for further workup including formal Echocardiogram, right and left cardiac catheterization on 7/18/17 and EP evaluation.        Patient was seen and examined this AM and reports she is feeling well.        	  MEDICATIONS:  metoprolol succinate  milliGRAM(s) Oral daily  amiodarone    Tablet 400 milliGRAM(s) Oral two times a day  citalopram 20 milliGRAM(s) Oral daily  pantoprazole    Tablet 40 milliGRAM(s) Oral before breakfast  simvastatin 5 milliGRAM(s) Oral at bedtime  levothyroxine 75 MICROGram(s) Oral daily  calcium carbonate 1250 mG (OsCal) 1 Tablet(s) Oral daily  cyanocobalamin 500 MICROGram(s) Oral daily  cholecalciferol 2000 Unit(s) Oral daily  apixaban 2.5 milliGRAM(s) Oral every 12 hours    ICU Vital Signs Last 24 Hrs  T(C): 36.1 (19 Jul 2017 15:52), Max: 36.6 (18 Jul 2017 21:13)  T(F): 97 (19 Jul 2017 15:52), Max: 97.9 (18 Jul 2017 21:13)  HR: 86 (19 Jul 2017 18:30) (78 - 110)  BP: 107/51 (19 Jul 2017 18:30) (92/71 - 128/66)  BP(mean): --  ABP: --  ABP(mean): --  RR: 18 (19 Jul 2017 18:30) (16 - 18)  SpO2: 98% (19 Jul 2017 06:22) (94% - 100%)    18 Jul 2017 07:01  -  19 Jul 2017 07:00  --------------------------------------------------------  IN: 330 mL / OUT: 600 mL / NET: -270 mL    19 Jul 2017 07:01  -  19 Jul 2017 15:51  --------------------------------------------------------  IN: 180 mL / OUT: 0 mL / NET: 180 mL                                    Appearance: Normal	  HEENT:   Normal oral mucosa, PERRL, EOMI	  Neck: Supple. No JVD.   Cardiovascular: + S1 S2. Irregular Irregular.   Respiratory: CTA Bilaterally. No rhonchi, wheezes, rales.   Gastrointestinal:  Obese. Soft, Non-tender, + BS	  Extremities: Normal range of motion, No clubbing, cyanosis or edema BLE. No calf tenderness BLE.   Neurologic: Non-focal  Psychiatry: A & O x 3, Mood & affect appropriate  Right Groin: Soft. Non-tender. No hematoma. +Ecchymosis                        11.5   5.4   )-----------( 162      ( 19 Jul 2017 06:20 )             35.4     07-19    143  |  108  |  38<H>  ----------------------------<  96  3.9   |  21<L>  |  1.40<H>    Ca    8.5      19 Jul 2017 06:20  Mg     2.0     07-19    PT/INR - ( 18 Jul 2017 18:53 )   PT: 13.8 sec;   INR: 1.24          PTT - ( 18 Jul 2017 18:53 )  PTT:>200.0 sec

## 2017-07-19 NOTE — PROVIDER CONTACT NOTE (OTHER) - SITUATION
Pt bleeding from R groin insertion site. Dressing not saturated but has moderate amount of blood on it.

## 2017-07-19 NOTE — PROGRESS NOTE ADULT - SUBJECTIVE AND OBJECTIVE BOX
Interventional Cardiology PA Adult Progress Note    Subjective Assessment: Patient seen and examined at bedside. Patient denies C/P, SOB, palpitations, dizziness, diaphoresis, N/V.   	  MEDICATIONS:  metoprolol succinate  milliGRAM(s) Oral daily  amiodarone    Tablet 400 milliGRAM(s) Oral two times a day  citalopram 20 milliGRAM(s) Oral daily  pantoprazole    Tablet 40 milliGRAM(s) Oral before breakfast  simvastatin 5 milliGRAM(s) Oral at bedtime  levothyroxine 75 MICROGram(s) Oral daily  calcium carbonate 1250 mG (OsCal) 1 Tablet(s) Oral daily  cyanocobalamin 500 MICROGram(s) Oral daily  cholecalciferol 2000 Unit(s) Oral daily  apixaban 2.5 milliGRAM(s) Oral every 12 hours      [PHYSICAL EXAM:  TELEMETRY:   T(C): 35.8 (17 @ 13:06), Max: 36.6 (17 @ 16:02)  HR: 89 (17 @ 14:30) (78 - 110)  BP: 128/66 (17 @ 14:30) (92/71 - 128/66)  RR: 18 (17 @ 13:20) (16 - 18)  SpO2: 98% (17 @ 06:22) (94% - 100%)  Wt(kg):   Daily Weight in k (2017 05:31)  I&O's Summary      -  2017 07:00  --------------------------------------------------------  IN: 330 mL / OUT: 600 mL / NET: -270 mL    :  -  2017 15:51  --------------------------------------------------------  IN: 180 mL / OUT: 0 mL / NET: 180 mL        Small: No  Central/PICC/Mid Line:  No                                      Appearance: Normal	  HEENT:   Normal oral mucosa, PERRL, EOMI	  Neck: Supple. No JVD.   Cardiovascular: + S1 S2. Irregular Irregular.   Respiratory: CTA Bilaterally. No rhonchi, wheezes, rales.   Gastrointestinal:  Obese. Soft, Non-tender, + BS	  Extremities: Normal range of motion, No clubbing, cyanosis or edema BLE. No calf tenderness BLE.   Neurologic: Non-focal  Psychiatry: A & O x 3, Mood & affect appropriate  Right Groin: Soft. Non-tender. No hematoma. +Ecchymosis        	    ECG:  	  RADIOLOGY:   DIAGNOSTIC TESTING:  [ ] Echocardiogram:  [ ]  Catheterization:  [ ] Stress Test:    [ ] KARIS  OTHER: 	    LABS:	 	  CARDIAC MARKERS:                                  11.5   5.4   )-----------( 162      ( 2017 06:20 )             35.4     07-19    143  |  108  |  38<H>  ----------------------------<  96  3.9   |  21<L>  |  1.40<H>    Ca    8.5      2017 06:20  Mg     2.0     -      proBNP:   Lipid Profile:   HgA1c:   TSH:   PT/INR - ( 2017 18:53 )   PT: 13.8 sec;   INR: 1.24          PTT - ( 2017 18:53 )  PTT:>200.0 sec    ASSESSMENT/PLAN: 	        DVT ppx:  Dispo: Interventional Cardiology PA Adult Progress Note    Subjective Assessment: Patient seen and examined at bedside. Patient denies C/P, SOB, palpitations, dizziness, diaphoresis, N/V.   	  MEDICATIONS:  metoprolol succinate  milliGRAM(s) Oral daily  amiodarone    Tablet 400 milliGRAM(s) Oral two times a day  citalopram 20 milliGRAM(s) Oral daily  pantoprazole    Tablet 40 milliGRAM(s) Oral before breakfast  simvastatin 5 milliGRAM(s) Oral at bedtime  levothyroxine 75 MICROGram(s) Oral daily  calcium carbonate 1250 mG (OsCal) 1 Tablet(s) Oral daily  cyanocobalamin 500 MICROGram(s) Oral daily  cholecalciferol 2000 Unit(s) Oral daily  apixaban 2.5 milliGRAM(s) Oral every 12 hours      [PHYSICAL EXAM:  TELEMETRY:   T(C): 35.8 (17 @ 13:06), Max: 36.6 (17 @ 16:02)  HR: 89 (17 @ 14:30) (78 - 110)  BP: 128/66 (17 @ 14:30) (92/71 - 128/66)  RR: 18 (17 @ 13:20) (16 - 18)  SpO2: 98% (17 @ 06:22) (94% - 100%)  Wt(kg):   Daily Weight in k (2017 05:31)  I&O's Summary      -  2017 07:00  --------------------------------------------------------  IN: 330 mL / OUT: 600 mL / NET: -270 mL    :  -  2017 15:51  --------------------------------------------------------  IN: 180 mL / OUT: 0 mL / NET: 180 mL        Small: No  Central/PICC/Mid Line:  No                                      Appearance: Normal	  HEENT:   Normal oral mucosa, PERRL, EOMI	  Neck: Supple. No JVD.   Cardiovascular: + S1 S2. Irregular Irregular.   Respiratory: CTA Bilaterally. No rhonchi, wheezes, rales.   Gastrointestinal:  Obese. Soft, Non-tender, + BS	  Extremities: Normal range of motion, No clubbing, cyanosis or edema BLE. No calf tenderness BLE.   Neurologic: Non-focal  Psychiatry: A & O x 3, Mood & affect appropriate  Right Groin: Soft. Non-tender. No hematoma. +Ecchymosis        	    ECG:  	  RADIOLOGY:   DIAGNOSTIC TESTING:  [ ] Echocardiogram:  [ ]  Catheterization:  [ ] Stress Test:    [ ] KARIS  OTHER: 	    LABS:	 	  CARDIAC MARKERS:                                  11.5   5.4   )-----------( 162      ( 2017 06:20 )             35.4     07-19    143  |  108  |  38<H>  ----------------------------<  96  3.9   |  21<L>  |  1.40<H>    Ca    8.5      2017 06:20  Mg     2.0     -      proBNP:   Lipid Profile:   HgA1c:   TSH:   PT/INR - ( 2017 18:53 )   PT: 13.8 sec;   INR: 1.24          PTT - ( 2017 18:53 )  PTT:>200.0 sec

## 2017-07-19 NOTE — PROGRESS NOTE ADULT - PROBLEM SELECTOR PLAN 2
JESÚS Ruelas following  - Rate controlled HR 80-90s  - continue Toprol XL 100mg Daily  -Heparin drip discontinued and Eliquis 2.5 mg PO q 12 hrs resumed post cath for AC.  - Amiodarone 400 mg BID (x 10 days) then 200 mg PO Daily started. LFTs normal. TSH normal on admission. No history of pulmonary disease. NPO after midnight for DCCV.  -Hypokalemia K+3.9 Kdur 40 mEq PO x 1 given. Follow-up AM BMP. Magnesium 2.0

## 2017-07-19 NOTE — PROGRESS NOTE ADULT - PROBLEM SELECTOR PLAN 2
JESÚS Ruelas following  - Rate controlled HR 80-90s  - continue Toprol XL 100mg Daily  -Heparin drip discontinued and Eliquis 2.5 mg PO q 12 hrs resumed post cath for AC.  - Amiodarone 400 mg BID (x 10 days) then 200 mg PO Daily started. LFTs normal. TSH normal on admission. No history of pulmonary disease. NPO after midnight for DCCV. JESÚS Ruelas following  - Rate controlled HR 80-90s  - continue Toprol XL 100mg Daily  -Heparin drip discontinued and Eliquis 2.5 mg PO q 12 hrs resumed post cath for AC.  - Amiodarone 400 mg BID (x 10 days) then 200 mg PO Daily started. LFTs normal. TSH normal on admission. No history of pulmonary disease. NPO after midnight for DCCV.  -Hypokalemia K+3.9 Kdur 40 mEq PO x 1 given. Follow-up AM BMP. Magnesium 2.0

## 2017-07-19 NOTE — PROGRESS NOTE ADULT - PROBLEM SELECTOR PLAN 6
- Pt takes Pravastatin 10mg and Zetia 10mg daily at home but simvastatin 5mg QD ordered here as home meds not on formulary; lipid panel wnl  DVT Prophylaxis: Eliquis  Dispo: Patient currently stable awaiting DCCV tomorrow. NPO after midnight. Physical Therapy evaluated and recommends no home PT needs. - Pt takes Pravastatin 10mg and Zetia 10mg daily at home but simvastatin 5mg QD ordered here as home meds not on formulary; lipid panel wnl    DVT Prophylaxis: Eliquis  Dispo: Patient currently stable awaiting DCCV tomorrow. NPO after midnight. Physical Therapy evaluated and recommends no home PT needs.

## 2017-07-19 NOTE — PHYSICAL THERAPY INITIAL EVALUATION ADULT - PERTINENT HX OF CURRENT PROBLEM, REHAB EVAL
85 year old female presented to [facility] endorsing SOB with minimal exertion x 5 days. Pt was admitted for acute systolic CHF in the setting of afib with RVR.

## 2017-07-19 NOTE — PROGRESS NOTE ADULT - ASSESSMENT
Patient is an 86yo F, former smoker, with PMHx HTN, HLD, Afib (on Eliquis) s/p failed DCCV in 1/2017 and hypothyroidism who presented to [facility] complaining of UNDERWOOD and was found to be in new acute systolic CHF exacerbation (EF 25% by echo there) in the setting of rapid afib. Patient was diuresed with IV lasix and is now euvolemic. Additionally, patient had 2.8 second pause on telemetry and was diagnosed with sick sinus syndrome. In light of pt's newly diagnosed systolic CHF and SSS pt was transferred to [facility] on 7/1717 for further workup including formal Echocardiogram, right and left cardiac catheterization on 7/18/17 and EP evaluation. 84yo F, former smoker, with PMHx HTN, HLD, Afib (on Eliquis) s/p failed DCCV in 1/2017 and hypothyroidism who presented to [facility] complaining of UNDERWOOD and was found to be in new acute systolic CHF exacerbation (EF 25% by echo there) in the setting of rapid Afib transferred to [facility] underwent diagnostic right and left cardiac cath revealing non-obstructive CAD and is currently awaiting DCCV tomorrow.

## 2017-07-19 NOTE — PROGRESS NOTE ADULT - PROBLEM SELECTOR PLAN 1
- Patient euvolemic this AM. Patient diuresed at [facility], continue daily weights and I & Os  - Echo 7/17/17 revealed dyskinetic septum with moderate to severe hypokinesis of the remaining regions, LVEF 20-25%, RV free wall is hypokinetic, mild MR, trace TR  - Cardiac cath 7/18 revealed non-obstructive CAD with normal Right Heart Pressures.   -Lisinopril 10 mg PO Daily ordered (equivalent to Ramipril 2.5 mg PO Daily).

## 2017-07-19 NOTE — PROGRESS NOTE ADULT - ASSESSMENT
84yo F, former smoker, with PMHx HTN, HLD, Afib (on Eliquis) s/p failed DCCV in 1/2017 and hypothyroidism who presented to [facility] complaining of UNDERWOOD and was found to be in new acute systolic CHF exacerbation (EF 25% by echo there) in the setting of rapid Afib transferred to [facility] underwent diagnostic right and left cardiac cath revealing non-obstructive CAD and is currently awaiting DCCV tomorrow.

## 2017-07-19 NOTE — PROGRESS NOTE ADULT - PROBLEM SELECTOR PLAN 3
EP Dr. Ruelas following  - Patient had 3 second pause at OSH while on cardizem gtt for which they dx SSS, per Dr. Ruelas unlikely; will make final recs post cath but does not think pt needs PPM  - no significant pauses noted overnight on telemetry JESÚS Ruelas following  - Patient had 3 second pause at OSH while on cardizem gtt for which they dx SSS, per Dr. Ruelas unlikely patient needs PPM.   - no significant pauses noted overnight on telemetry

## 2017-07-20 PROBLEM — Z00.00 ENCOUNTER FOR PREVENTIVE HEALTH EXAMINATION: Status: ACTIVE | Noted: 2017-07-20

## 2017-07-20 LAB
ANION GAP SERPL CALC-SCNC: 10 MMOL/L — SIGNIFICANT CHANGE UP (ref 5–17)
ANION GAP SERPL CALC-SCNC: 9 MMOL/L — SIGNIFICANT CHANGE UP (ref 5–17)
BUN SERPL-MCNC: 34 MG/DL — HIGH (ref 7–23)
BUN SERPL-MCNC: 35 MG/DL — HIGH (ref 7–23)
CALCIUM SERPL-MCNC: 8.6 MG/DL — SIGNIFICANT CHANGE UP (ref 8.4–10.5)
CALCIUM SERPL-MCNC: 8.9 MG/DL — SIGNIFICANT CHANGE UP (ref 8.4–10.5)
CHLORIDE SERPL-SCNC: 107 MMOL/L — SIGNIFICANT CHANGE UP (ref 96–108)
CHLORIDE SERPL-SCNC: 108 MMOL/L — SIGNIFICANT CHANGE UP (ref 96–108)
CO2 SERPL-SCNC: 21 MMOL/L — LOW (ref 22–31)
CO2 SERPL-SCNC: 23 MMOL/L — SIGNIFICANT CHANGE UP (ref 22–31)
CREAT SERPL-MCNC: 1.4 MG/DL — HIGH (ref 0.5–1.3)
CREAT SERPL-MCNC: 1.5 MG/DL — HIGH (ref 0.5–1.3)
GLUCOSE SERPL-MCNC: 102 MG/DL — HIGH (ref 70–99)
GLUCOSE SERPL-MCNC: 96 MG/DL — SIGNIFICANT CHANGE UP (ref 70–99)
HCT VFR BLD CALC: 34.1 % — LOW (ref 34.5–45)
HGB BLD-MCNC: 11 G/DL — LOW (ref 11.5–15.5)
MAGNESIUM SERPL-MCNC: 2 MG/DL — SIGNIFICANT CHANGE UP (ref 1.6–2.6)
MCHC RBC-ENTMCNC: 31 PG — SIGNIFICANT CHANGE UP (ref 27–34)
MCHC RBC-ENTMCNC: 32.3 G/DL — SIGNIFICANT CHANGE UP (ref 32–36)
MCV RBC AUTO: 96.1 FL — SIGNIFICANT CHANGE UP (ref 80–100)
PLATELET # BLD AUTO: 151 K/UL — SIGNIFICANT CHANGE UP (ref 150–400)
POTASSIUM SERPL-MCNC: 4.9 MMOL/L — SIGNIFICANT CHANGE UP (ref 3.5–5.3)
POTASSIUM SERPL-MCNC: 5.2 MMOL/L — SIGNIFICANT CHANGE UP (ref 3.5–5.3)
POTASSIUM SERPL-SCNC: 4.9 MMOL/L — SIGNIFICANT CHANGE UP (ref 3.5–5.3)
POTASSIUM SERPL-SCNC: 5.2 MMOL/L — SIGNIFICANT CHANGE UP (ref 3.5–5.3)
RBC # BLD: 3.55 M/UL — LOW (ref 3.8–5.2)
RBC # FLD: 12.9 % — SIGNIFICANT CHANGE UP (ref 10.3–16.9)
SODIUM SERPL-SCNC: 138 MMOL/L — SIGNIFICANT CHANGE UP (ref 135–145)
SODIUM SERPL-SCNC: 140 MMOL/L — SIGNIFICANT CHANGE UP (ref 135–145)
WBC # BLD: 7.5 K/UL — SIGNIFICANT CHANGE UP (ref 3.8–10.5)
WBC # FLD AUTO: 7.5 K/UL — SIGNIFICANT CHANGE UP (ref 3.8–10.5)

## 2017-07-20 PROCEDURE — 99233 SBSQ HOSP IP/OBS HIGH 50: CPT

## 2017-07-20 PROCEDURE — 92960 CARDIOVERSION ELECTRIC EXT: CPT

## 2017-07-20 RX ORDER — METOPROLOL TARTRATE 50 MG
50 TABLET ORAL DAILY
Qty: 0 | Refills: 0 | Status: DISCONTINUED | OUTPATIENT
Start: 2017-07-21 | End: 2017-07-22

## 2017-07-20 RX ORDER — FUROSEMIDE 40 MG
20 TABLET ORAL DAILY
Qty: 0 | Refills: 0 | Status: DISCONTINUED | OUTPATIENT
Start: 2017-07-20 | End: 2017-07-21

## 2017-07-20 RX ORDER — POTASSIUM CHLORIDE 20 MEQ
40 PACKET (EA) ORAL ONCE
Qty: 0 | Refills: 0 | Status: COMPLETED | OUTPATIENT
Start: 2017-07-20 | End: 2017-07-20

## 2017-07-20 RX ADMIN — Medication 40 MILLIEQUIVALENT(S): at 07:19

## 2017-07-20 RX ADMIN — PREGABALIN 500 MICROGRAM(S): 225 CAPSULE ORAL at 13:50

## 2017-07-20 RX ADMIN — LISINOPRIL 10 MILLIGRAM(S): 2.5 TABLET ORAL at 06:15

## 2017-07-20 RX ADMIN — APIXABAN 2.5 MILLIGRAM(S): 2.5 TABLET, FILM COATED ORAL at 06:15

## 2017-07-20 RX ADMIN — AMIODARONE HYDROCHLORIDE 400 MILLIGRAM(S): 400 TABLET ORAL at 10:06

## 2017-07-20 RX ADMIN — Medication 2000 UNIT(S): at 13:50

## 2017-07-20 RX ADMIN — CITALOPRAM 20 MILLIGRAM(S): 10 TABLET, FILM COATED ORAL at 13:50

## 2017-07-20 RX ADMIN — PANTOPRAZOLE SODIUM 40 MILLIGRAM(S): 20 TABLET, DELAYED RELEASE ORAL at 06:15

## 2017-07-20 RX ADMIN — SIMVASTATIN 5 MILLIGRAM(S): 20 TABLET, FILM COATED ORAL at 21:32

## 2017-07-20 RX ADMIN — Medication 20 MILLIGRAM(S): at 07:38

## 2017-07-20 RX ADMIN — Medication 1 TABLET(S): at 13:50

## 2017-07-20 RX ADMIN — APIXABAN 2.5 MILLIGRAM(S): 2.5 TABLET, FILM COATED ORAL at 18:48

## 2017-07-20 RX ADMIN — Medication 75 MICROGRAM(S): at 06:15

## 2017-07-20 RX ADMIN — AMIODARONE HYDROCHLORIDE 400 MILLIGRAM(S): 400 TABLET ORAL at 21:32

## 2017-07-20 NOTE — PROGRESS NOTE ADULT - PROBLEM SELECTOR PLAN 6
- Pt takes Pravastatin 10mg and Zetia 10mg daily at home but simvastatin 5mg QD ordered here as home meds not on formulary; lipid panel wnl    DVT Prophylaxis: Eliquis  Dispo: Patient currently stable s/p DCCV. Patient to be observed overnight due to Bradycardia and likely discharge tomorrow. Physical Therapy evaluated and recommends no home PT needs.

## 2017-07-20 NOTE — PROGRESS NOTE ADULT - SUBJECTIVE AND OBJECTIVE BOX
Interventional Cardiology PA Adult Progress Note    Subjective Assessment: Patient seen and examined at bedside pre and post DCCV. Patient denies C/P, SOB, palpitations, dizziness, diaphoresis, N/V  	  MEDICATIONS:  amiodarone    Tablet 400 milliGRAM(s) Oral two times a day  lisinopril 10 milliGRAM(s) Oral daily  furosemide    Tablet 20 milliGRAM(s) Oral daily  citalopram 20 milliGRAM(s) Oral daily  pantoprazole    Tablet 40 milliGRAM(s) Oral before breakfast  simvastatin 5 milliGRAM(s) Oral at bedtime  levothyroxine 75 MICROGram(s) Oral daily  calcium carbonate 1250 mG (OsCal) 1 Tablet(s) Oral daily  cyanocobalamin 500 MICROGram(s) Oral daily  cholecalciferol 2000 Unit(s) Oral daily  apixaban 2.5 milliGRAM(s) Oral every 12 hours      	    [PHYSICAL EXAM:  TELEMETRY: Afib now NSR with SB   T(C): 35.9 (07-20-17 @ 14:50), Max: 36.5 (07-20-17 @ 08:50)  HR: 115 (07-20-17 @ 11:35) (69 - 115)  BP: 124/61 (07-20-17 @ 11:35) (98/60 - 124/61)  RR: 16 (07-20-17 @ 06:11) (16 - 18)  SpO2: 97% (07-20-17 @ 06:11) (97% - 97%)  Wt(kg): --  I&O's Summary    19 Jul 2017 07:01  -  20 Jul 2017 07:00  --------------------------------------------------------  IN: 180 mL / OUT: 0 mL / NET: 180 mL        Small: No  Central/PICC/Mid Line:    No                                    Appearance: Normal	  HEENT:   Normal oral mucosa, PERRL, EOMI	  Neck: Supple. No JVD.   Cardiovascular: + S1 S2. RRR. No murmurs.   Respiratory: CTA Bilaterally. No rhonchi, wheezes, rales   Gastrointestinal:  Obese. Soft, Non-tender, + BS	  Extremities: Normal range of motion, No clubbing, cyanosis or edema BLE   Neurologic: Non-focal  Psychiatry: A & O x 3, Mood & affect appropriate  Right Groin: No hematoma, bleed or bruit. +Severe ecchymosis  Distal Pulses Intact to Baseline    	    ECG:  	  RADIOLOGY:   DIAGNOSTIC TESTING:  [ ] Echocardiogram:  [ ]  Catheterization:  [ ] Stress Test:    [ ] KARIS  OTHER: 	    LABS:	 	  CARDIAC MARKERS:                    11.0   7.5   )-----------( 151      ( 20 Jul 2017 06:16 )             34.1     07-20    138  |  107  |  35<H>  ----------------------------<  96  5.2   |  21<L>  |  1.40<H>    Ca    8.9      20 Jul 2017 11:22  Mg     2.0     07-20      proBNP:   Lipid Profile:   HgA1c:   TSH:

## 2017-07-20 NOTE — PROGRESS NOTE ADULT - PROBLEM SELECTOR PLAN 1
- Patient euvolemic this AM. Patient diuresed at [facility], continue daily weights and I & Os  -NICM likely tachycardia induced. Non obstructive CAD by diagnostic cardiac 7/18 with normal right heart pressures.   - Echo 7/17/17 revealed dyskinetic septum with moderate to severe hypokinesis of the remaining regions, LVEF 20-25%, RV free wall is hypokinetic, mild MR, trace TR   -Lisinopril 10 mg PO Daily ordered (equivalent to Ramipril 2.5 mg PO Daily).

## 2017-07-20 NOTE — PROGRESS NOTE ADULT - PROBLEM SELECTOR PLAN 3
EP Dr. Ruelas following  - Patient had 3 second pause at OSH while on cardizem gtt for which they dx SSS, per Dr. Ruelas patient does not need PPM.    - no significant pauses noted overnight on telemetry

## 2017-07-20 NOTE — PROGRESS NOTE ADULT - PROBLEM SELECTOR PLAN 2
JESÚS Ruelas following  - s/p successful DCCV with 1 shock- patient currently in NSR with episodes of SB-HR 40-50s   - Toprol XL reduced to 50 mg PO Daily.   -Continue Eliquis for Anti-coagulation.   - Amiodarone 400 mg BID (x 10 days) then 200 mg PO Daily . LFTs normal. TSH normal on admission. No history of pulmonary disease.   -Hypokalemia Improved after Po supplementation. Magnesium 2.0. Continue to monitor.

## 2017-07-20 NOTE — PROGRESS NOTE ADULT - ASSESSMENT
86yo F, former smoker, with PMHx HTN, HLD, Afib (on Eliquis) s/p failed DCCV in 1/2017 and hypothyroidism who presented to [facility] complaining of UNDERWOOD and was found to be in new acute systolic CHF exacerbation (EF 25% by echo there) in the setting of rapid Afib transferred to [facility] underwent diagnostic right and left cardiac cath revealing non-obstructive CAD and is s/p successful DCCV currently in NSR.

## 2017-07-20 NOTE — PROGRESS NOTE ADULT - PROBLEM SELECTOR PLAN 5
- SBP labile 100-120's; continue Toprol XL 100mg PO Daily.  Lisinopril 10 mg PO Daily ordered (equivalent to Ramipril 2.5 mg PO Daily).  Monitor BP closely. Given EF 20-25% SPB 90-100s expected.

## 2017-07-21 DIAGNOSIS — N18.4 CHRONIC KIDNEY DISEASE, STAGE 4 (SEVERE): ICD-10-CM

## 2017-07-21 LAB
ANION GAP SERPL CALC-SCNC: 11 MMOL/L — SIGNIFICANT CHANGE UP (ref 5–17)
ANION GAP SERPL CALC-SCNC: 13 MMOL/L — SIGNIFICANT CHANGE UP (ref 5–17)
ANION GAP SERPL CALC-SCNC: 13 MMOL/L — SIGNIFICANT CHANGE UP (ref 5–17)
APPEARANCE UR: CLEAR — SIGNIFICANT CHANGE UP
BILIRUB UR-MCNC: NEGATIVE — SIGNIFICANT CHANGE UP
BUN SERPL-MCNC: 44 MG/DL — HIGH (ref 7–23)
BUN SERPL-MCNC: 46 MG/DL — HIGH (ref 7–23)
BUN SERPL-MCNC: 48 MG/DL — HIGH (ref 7–23)
CALCIUM SERPL-MCNC: 8.8 MG/DL — SIGNIFICANT CHANGE UP (ref 8.4–10.5)
CALCIUM SERPL-MCNC: 8.9 MG/DL — SIGNIFICANT CHANGE UP (ref 8.4–10.5)
CALCIUM SERPL-MCNC: 9.2 MG/DL — SIGNIFICANT CHANGE UP (ref 8.4–10.5)
CHLORIDE SERPL-SCNC: 102 MMOL/L — SIGNIFICANT CHANGE UP (ref 96–108)
CHLORIDE SERPL-SCNC: 104 MMOL/L — SIGNIFICANT CHANGE UP (ref 96–108)
CHLORIDE SERPL-SCNC: 106 MMOL/L — SIGNIFICANT CHANGE UP (ref 96–108)
CO2 SERPL-SCNC: 21 MMOL/L — LOW (ref 22–31)
CO2 SERPL-SCNC: 21 MMOL/L — LOW (ref 22–31)
CO2 SERPL-SCNC: 22 MMOL/L — SIGNIFICANT CHANGE UP (ref 22–31)
COLOR SPEC: YELLOW — SIGNIFICANT CHANGE UP
CREAT ?TM UR-MCNC: 85 MG/DL — SIGNIFICANT CHANGE UP
CREAT SERPL-MCNC: 2.3 MG/DL — HIGH (ref 0.5–1.3)
CREAT SERPL-MCNC: 2.5 MG/DL — HIGH (ref 0.5–1.3)
CREAT SERPL-MCNC: 2.7 MG/DL — HIGH (ref 0.5–1.3)
DIFF PNL FLD: NEGATIVE — SIGNIFICANT CHANGE UP
GLUCOSE SERPL-MCNC: 108 MG/DL — HIGH (ref 70–99)
GLUCOSE SERPL-MCNC: 98 MG/DL — SIGNIFICANT CHANGE UP (ref 70–99)
GLUCOSE SERPL-MCNC: 99 MG/DL — SIGNIFICANT CHANGE UP (ref 70–99)
GLUCOSE UR QL: NEGATIVE — SIGNIFICANT CHANGE UP
HCT VFR BLD CALC: 32 % — LOW (ref 34.5–45)
HGB BLD-MCNC: 10.2 G/DL — LOW (ref 11.5–15.5)
KETONES UR-MCNC: NEGATIVE — SIGNIFICANT CHANGE UP
LEUKOCYTE ESTERASE UR-ACNC: (no result)
MAGNESIUM SERPL-MCNC: 2 MG/DL — SIGNIFICANT CHANGE UP (ref 1.6–2.6)
MCHC RBC-ENTMCNC: 30.9 PG — SIGNIFICANT CHANGE UP (ref 27–34)
MCHC RBC-ENTMCNC: 31.9 G/DL — LOW (ref 32–36)
MCV RBC AUTO: 97 FL — SIGNIFICANT CHANGE UP (ref 80–100)
NITRITE UR-MCNC: NEGATIVE — SIGNIFICANT CHANGE UP
OSMOLALITY UR: 313 MOSMOL/KG — SIGNIFICANT CHANGE UP (ref 100–650)
PH UR: 5.5 — SIGNIFICANT CHANGE UP (ref 5–8)
PLATELET # BLD AUTO: 150 K/UL — SIGNIFICANT CHANGE UP (ref 150–400)
POTASSIUM SERPL-MCNC: 5.1 MMOL/L — SIGNIFICANT CHANGE UP (ref 3.5–5.3)
POTASSIUM SERPL-MCNC: 5.5 MMOL/L — HIGH (ref 3.5–5.3)
POTASSIUM SERPL-MCNC: 5.8 MMOL/L — HIGH (ref 3.5–5.3)
POTASSIUM SERPL-SCNC: 5.1 MMOL/L — SIGNIFICANT CHANGE UP (ref 3.5–5.3)
POTASSIUM SERPL-SCNC: 5.5 MMOL/L — HIGH (ref 3.5–5.3)
POTASSIUM SERPL-SCNC: 5.8 MMOL/L — HIGH (ref 3.5–5.3)
POTASSIUM UR-SCNC: 66 MMOL/L — SIGNIFICANT CHANGE UP
PROT UR-MCNC: NEGATIVE MG/DL — SIGNIFICANT CHANGE UP
RBC # BLD: 3.3 M/UL — LOW (ref 3.8–5.2)
RBC # FLD: 13 % — SIGNIFICANT CHANGE UP (ref 10.3–16.9)
SODIUM SERPL-SCNC: 136 MMOL/L — SIGNIFICANT CHANGE UP (ref 135–145)
SODIUM SERPL-SCNC: 137 MMOL/L — SIGNIFICANT CHANGE UP (ref 135–145)
SODIUM SERPL-SCNC: 140 MMOL/L — SIGNIFICANT CHANGE UP (ref 135–145)
SODIUM UR-SCNC: 20 MMOL/L — SIGNIFICANT CHANGE UP
SP GR SPEC: 1.01 — SIGNIFICANT CHANGE UP (ref 1–1.03)
UROBILINOGEN FLD QL: 0.2 E.U./DL — SIGNIFICANT CHANGE UP
UUN UR-MCNC: 333 MG/DL — SIGNIFICANT CHANGE UP
WBC # BLD: 4.2 K/UL — SIGNIFICANT CHANGE UP (ref 3.8–10.5)
WBC # FLD AUTO: 4.2 K/UL — SIGNIFICANT CHANGE UP (ref 3.8–10.5)

## 2017-07-21 PROCEDURE — 99233 SBSQ HOSP IP/OBS HIGH 50: CPT

## 2017-07-21 RX ORDER — SODIUM CHLORIDE 9 MG/ML
1000 INJECTION, SOLUTION INTRAVENOUS
Qty: 0 | Refills: 0 | Status: DISCONTINUED | OUTPATIENT
Start: 2017-07-21 | End: 2017-07-22

## 2017-07-21 RX ADMIN — Medication 50 MILLIGRAM(S): at 06:35

## 2017-07-21 RX ADMIN — PREGABALIN 500 MICROGRAM(S): 225 CAPSULE ORAL at 10:09

## 2017-07-21 RX ADMIN — AMIODARONE HYDROCHLORIDE 400 MILLIGRAM(S): 400 TABLET ORAL at 10:41

## 2017-07-21 RX ADMIN — Medication 1 TABLET(S): at 10:42

## 2017-07-21 RX ADMIN — SODIUM CHLORIDE 30 MILLILITER(S): 9 INJECTION, SOLUTION INTRAVENOUS at 14:33

## 2017-07-21 RX ADMIN — APIXABAN 2.5 MILLIGRAM(S): 2.5 TABLET, FILM COATED ORAL at 10:09

## 2017-07-21 RX ADMIN — Medication 2000 UNIT(S): at 10:42

## 2017-07-21 RX ADMIN — CITALOPRAM 20 MILLIGRAM(S): 10 TABLET, FILM COATED ORAL at 10:41

## 2017-07-21 RX ADMIN — LISINOPRIL 10 MILLIGRAM(S): 2.5 TABLET ORAL at 06:35

## 2017-07-21 RX ADMIN — SIMVASTATIN 5 MILLIGRAM(S): 20 TABLET, FILM COATED ORAL at 22:05

## 2017-07-21 RX ADMIN — Medication 20 MILLIGRAM(S): at 06:35

## 2017-07-21 RX ADMIN — APIXABAN 2.5 MILLIGRAM(S): 2.5 TABLET, FILM COATED ORAL at 22:05

## 2017-07-21 RX ADMIN — Medication 75 MICROGRAM(S): at 06:35

## 2017-07-21 RX ADMIN — PANTOPRAZOLE SODIUM 40 MILLIGRAM(S): 20 TABLET, DELAYED RELEASE ORAL at 06:35

## 2017-07-21 RX ADMIN — AMIODARONE HYDROCHLORIDE 400 MILLIGRAM(S): 400 TABLET ORAL at 22:05

## 2017-07-21 NOTE — PROGRESS NOTE ADULT - PROBLEM SELECTOR PLAN 1
- Patient euvolemic this AM. Patient diuresed at [facility], continue daily weights and I & Os  -NICM likely tachycardia induced. Non obstructive CAD by diagnostic cardiac 7/18 with normal right heart pressures.   - Echo 7/17/17 revealed dyskinetic septum with moderate to severe hypokinesis of the remaining regions, LVEF 20-25%, RV free wall is hypokinetic, mild MR, trace TR   -Lisinopril being held due to MISSY on CKD- (received 6 AM dose then discontinued).

## 2017-07-21 NOTE — PROGRESS NOTE ADULT - PROBLEM SELECTOR PLAN 5
- SBP labile 's; continue Toprol XL 100mg PO Daily.  Monitor BP closely. Given EF 20-25% SPB 90-100s expected.

## 2017-07-21 NOTE — PROGRESS NOTE ADULT - PROBLEM SELECTOR PLAN 7
MISSY on CKD Cr on admission 1.6 now Cr 2.7 with Hyperkalemia K+5.5 (no hemolysis). No Kayexalate at this time per Dr. Chinchilla. Furosemide and Lisinopril discontinued (patient already received 6 AM prior to discontinuation).

## 2017-07-21 NOTE — PROGRESS NOTE ADULT - SUBJECTIVE AND OBJECTIVE BOX
Interventional Cardiology PA Adult Progress Note    Subjective Assessment: Patient seen and examined at bedside. Patient expresses desire to go home. Patient denies C/P, SOB, palpitations, dizziness, diaphoresis, N/V.   	  MEDICATIONS:  amiodarone    Tablet 400 milliGRAM(s) Oral two times a day  metoprolol succinate ER 50 milliGRAM(s) Oral daily  citalopram 20 milliGRAM(s) Oral daily  pantoprazole    Tablet 40 milliGRAM(s) Oral before breakfast  simvastatin 5 milliGRAM(s) Oral at bedtime  levothyroxine 75 MICROGram(s) Oral daily  calcium carbonate 1250 mG (OsCal) 1 Tablet(s) Oral daily  cyanocobalamin 500 MICROGram(s) Oral daily  cholecalciferol 2000 Unit(s) Oral daily  apixaban 2.5 milliGRAM(s) Oral every 12 hours  sodium chloride 0.45%. 1000 milliLiter(s) IV Continuous <Continuous>      	    [PHYSICAL EXAM:  TELEMETRY: SB   T(C): 35.8 (07-21-17 @ 13:22), Max: 35.8 (07-20-17 @ 21:13)  HR: 55 (07-21-17 @ 13:25) (51 - 62)  BP: 86/42 (07-21-17 @ 13:25) (86/42 - 170/59)  RR: 15 (07-21-17 @ 13:25) (15 - 18)  SpO2: 96% (07-21-17 @ 09:13) (96% - 98%) RA   Wt(kg): --  I&O's Summary    21 Jul 2017 07:01  -  21 Jul 2017 16:43  --------------------------------------------------------  IN: 160 mL / OUT: 0 mL / NET: 160 mL        Small: No  Central/PICC/Mid Line:    No                                     Appearance: Normal	  HEENT:   Normal oral mucosa, PERRL, EOMI	  Neck: Supple. No JVD.   Cardiovascular: + S1 S2. RRR. 2/6 Holosystolic Murmur. No rubs or gallops.   Respiratory: CTA Bilaterally. No rhonchi, wheezes, rales.   Gastrointestinal:  Obese. BS x 4. Soft NT/ND   Extremities: Normal range of motion, No clubbing, cyanosis or edema ble   Neurologic: Non-focal  Psychiatry: A & O x 3, Mood & affect appropriate  Right Groin: Soft. Non-tender. No hematoma, bleed or bruit. _Severe ecchymosis.       	    ECG:  	  RADIOLOGY:   DIAGNOSTIC TESTING:  [ ] Echocardiogram:  [ ]  Catheterization:  [ ] Stress Test:    [ ] KARIS  OTHER: 	    LABS:	 	  CARDIAC MARKERS:                                  10.2   4.2   )-----------( 150      ( 21 Jul 2017 07:41 )             32.0     07-21    140  |  106  |  46<H>  ----------------------------<  108<H>  5.5<H>   |  21<L>  |  2.70<H>    Ca    8.8      21 Jul 2017 14:26  Mg     2.0     07-21      proBNP:   Lipid Profile:   HgA1c:   TSH:

## 2017-07-21 NOTE — PROGRESS NOTE ADULT - PROBLEM SELECTOR PLAN 1
- Patient euvolemic this AM. Patient diuresed at [facility], continue daily weights and I & Os  -NICM likely tachycardia induced. Non obstructive CAD by diagnostic cardiac 7/18 with normal right heart pressures.   - Echo 7/17/17 revealed dyskinetic septum with moderate to severe hypokinesis of the remaining regions, LVEF 20-25%, RV free wall is hypokinetic, mild MR, trace TR   -Lisinopril being held due to MISSY on CKD- (received 6 AM dose then discontinued). - Patient euvolemic this AM. Patient diuresed at [facility], continue daily weights and I & Os  -NICM likely tachycardia induced. Non obstructive CAD by diagnostic cardiac 7/18 with normal right heart pressures.   - Echo 7/17/17 revealed dyskinetic septum with moderate to severe hypokinesis of the remaining regions, LVEF 20-25%, RV free wall is hypokinetic, mild MR, trace TR   -Lisinopril and Furosemide being held due to MISSY on CKD- (received 6 AM dose then discontinued).

## 2017-07-21 NOTE — PROGRESS NOTE ADULT - PROBLEM SELECTOR PLAN 7
MISSY on CKD Cr on admission 1.6 now Cr 2.7 with Hyperkalemia K+5.5 (no hemolysis) MISSY on CKD Cr on admission 1.6 now Cr 2.7 with Hyperkalemia K+5.5 (no hemolysis). No Kayexalate at this time per Dr. Chinchilla. Furosemide and Lisinopril discontinued (patient already received 6 AM prior to discontinuation). - Pt takes Pravastatin 10mg and Zetia 10mg daily at home but simvastatin 5mg QD ordered here as home meds not on formulary; lipid panel wnl    DVT Prophylaxis: Eliquis  Dispo: Patient currently stable. Follow-up renal function in AM.

## 2017-07-21 NOTE — PROGRESS NOTE ADULT - PROBLEM SELECTOR PLAN 2
JESÚS Ruelas following  - s/p successful DCCV with 1 shock- patient currently in NSR with episodes of SB-HR 40-50s   - Toprol XL reduced to 50 mg PO Daily.   -Continue Eliquis for Anti-coagulation.   - Amiodarone 400 mg BID (x 10 days until 7/29) then 200 mg PO Daily . LFTs normal. TSH normal on admission. No history of pulmonary disease.   -Magnesium 2.0. Hyperkalemia K+5.5. no hemolysis-no Kayexalate at this time per Dr. Chinchilla. JESÚS Ruelas following  - s/p successful DCCV with 1 shock- patient currently in NSR with episodes of SB-HR 40-50s   - Continue reduced Toprol XL dose of 50 mg PO Daily.   -Continue Eliquis for Anti-coagulation.   - Amiodarone 400 mg BID (x 10 days until 7/29) then 200 mg PO Daily . LFTs normal. TSH normal on admission. No history of pulmonary disease.   -Magnesium 2.0. Hyperkalemia K+5.5. no hemolysis-no Kayexalate at this time per Dr. Chinchilla.

## 2017-07-21 NOTE — PROGRESS NOTE ADULT - ASSESSMENT
84yo F, former smoker, with PMHx HTN, Hyperlipidemia, Afib (on Eliquis) s/p failed DCCV in 1/2017 and hypothyroidism, CKD Stage IV (baseline Cr unknown) who presented to [facility] complaining of UNDERWOOD and was found to be in new acute systolic CHF exacerbation (EF 25% by echo there) in the setting of rapid Afib transferred to [facility] underwent diagnostic right and left cardiac cath revealing non-obstructive CAD and is s/p successful DCCV currently in NSR. Hospital course complicated by MISSY on CKD.

## 2017-07-21 NOTE — PROGRESS NOTE ADULT - PROBLEM SELECTOR PLAN 5
- SBP labile 100-120's; continue Toprol XL 100mg PO Daily.  Monitor BP closely. Given EF 20-25% SPB 90-100s expected. - SBP labile 's; continue Toprol XL 100mg PO Daily.  Monitor BP closely. Given EF 20-25% SPB 90-100s expected. - SBP labile 's; continue Toprol XL 50 mg PO Daily.  Monitor BP closely. Given EF 20-25% SPB 90-100s expected.

## 2017-07-21 NOTE — DIETITIAN INITIAL EVALUATION ADULT. - NS AS NUTRI INTERV ED CONTENT2
Recommended modifications/f/u to review with family if paulo/Nutrition relationship to health/disease/Purpose of the nutrition education

## 2017-07-21 NOTE — PROGRESS NOTE ADULT - SUBJECTIVE AND OBJECTIVE BOX
diagnosed systolic CHF (EF 25% at [facility]), hypothyroidism, newly diagnosed SSS no pacemaker presents to [facility] endorsing SOB with minimal exertion x 5 days. Denied chest pain, dizziness, palpitations, nausea. Pt was admitted for acute systolic CHF in the setting of afib with RVR. Pt was diuresed with IV Lasix. Pt now euvolemic. Pt was noted to have 2.8 second pause on telemetry and diagnosed with sick sinus syndrome. Pt had Echocardiogram which showed EF 25%. In light of pt's newly diagnosed systolic chf diagnosis and SSS pt transferred to [facility] on 7/1717 for further workup including formal Echocardiogram, right and left cardiac catheterization on 7/18/17 and EP evaluation.        Patient was seen and examined this AM and reports she is feeling well.     Patient wants to go home.   	  MEDICATIONS:  amiodarone    Tablet 400 milliGRAM(s) Oral two times a day  metoprolol succinate ER 50 milliGRAM(s) Oral daily  citalopram 20 milliGRAM(s) Oral daily  pantoprazole    Tablet 40 milliGRAM(s) Oral before breakfast  simvastatin 5 milliGRAM(s) Oral at bedtime  levothyroxine 75 MICROGram(s) Oral daily  calcium carbonate 1250 mG (OsCal) 1 Tablet(s) Oral daily  cyanocobalamin 500 MICROGram(s) Oral daily  cholecalciferol 2000 Unit(s) Oral daily  apixaban 2.5 milliGRAM(s) Oral every 12 hours  sodium chloride 0.45%. 1000 milliLiter(s) IV Continuous <Continuous>      ICU Vital Signs Last 24 Hrs  T(C): 35.9 (21 Jul 2017 17:20), Max: 35.9 (21 Jul 2017 17:20)  T(F): 96.7 (21 Jul 2017 17:20), Max: 96.7 (21 Jul 2017 17:20)  HR: 55 (21 Jul 2017 13:25) (51 - 61)  BP: 86/42 (21 Jul 2017 13:25) (86/42 - 170/59)  BP(mean): --  ABP: --  ABP(mean): --  RR: 15 (21 Jul 2017 13:25) (15 - 18)  SpO2: 96% (21 Jul 2017 09:13) (96% - 98%)  	      21 Jul 2017 07:01  -  21 Jul 2017 16:43  --------------------------------------------------------  IN: 160 mL / OUT: 0 mL / NET: 160 mL        Small: No  Central/PICC/Mid Line:    No                                     Appearance: Normal	  HEENT:   Normal oral mucosa, PERRL, EOMI	  Neck: Supple. No JVD.   Cardiovascular: + S1 S2. RRR. 2/6 Holosystolic Murmur. No rubs or gallops.   Respiratory: CTA Bilaterally. No rhonchi, wheezes, rales.   Gastrointestinal:  Obese. BS x 4. Soft NT/ND   Extremities: Normal range of motion, No clubbing, cyanosis or edema ble   Neurologic: Non-focal  Psychiatry: A & O x 3, Mood & affect appropriate  Right Groin: Soft. Non-tender. No hematoma, bleed or bruit. _Severe ecchymosis.                                   10.2   4.2   )-----------( 150      ( 21 Jul 2017 07:41 )             32.0     07-21    140  |  106  |  46<H>  ----------------------------<  108<H>  5.5<H>   |  21<L>  |  2.70<H>    Ca    8.8      21 Jul 2017 14:26  Mg     2.0     07-21      proBNP:   Lipid Profile:   HgA1c:   TSH:

## 2017-07-21 NOTE — DIETITIAN INITIAL EVALUATION ADULT. - OTHER INFO
patient states weight normal , needs assistance with set up , no pain , no n/v/d/c ,  + CHF  /1000CC   MD orders

## 2017-07-21 NOTE — PROGRESS NOTE ADULT - ASSESSMENT
86yo F, former smoker, with PMHx HTN, Hyperlipidemia, Afib (on Eliquis) s/p failed DCCV in 1/2017 and hypothyroidism, CKD Stage IV (baseline Cr unknown) who presented to [facility] complaining of UNDERWOOD and was found to be in new acute systolic CHF exacerbation (EF 25% by echo there) in the setting of rapid Afib transferred to [facility] underwent diagnostic right and left cardiac cath revealing non-obstructive CAD and is s/p successful DCCV currently in NSR. Hospital course complicated by MISSY on CKD.

## 2017-07-21 NOTE — PROGRESS NOTE ADULT - PROBLEM SELECTOR PLAN 2
JESÚS Ruelas following  - s/p successful DCCV with 1 shock- patient currently in NSR with episodes of SB-HR 40-50s   - Toprol XL reduced to 50 mg PO Daily.   -Continue Eliquis for Anti-coagulation.   - Amiodarone 400 mg BID (x 10 days until 7/29) then 200 mg PO Daily . LFTs normal. TSH normal on admission. No history of pulmonary disease.   -Magnesium 2.0. Hyperkalemia K+5.5. no hemolysis-no Kayexalate at this time per Dr. Chinchilla.

## 2017-07-21 NOTE — PROGRESS NOTE ADULT - PROBLEM SELECTOR PLAN 6
- Pt takes Pravastatin 10mg and Zetia 10mg daily at home but simvastatin 5mg QD ordered here as home meds not on formulary; lipid panel wnl    DVT Prophylaxis: Eliquis  Dispo: Patient currently stable s/p DCCV. Patient to be observed overnight due to Bradycardia and likely discharge tomorrow. Physical Therapy evaluated and recommends no home PT needs. MISSY on CKD Cr on admission 1.6 now Cr 2.7 with Hyperkalemia K+5.5 (no hemolysis). No Kayexalate at this time per Dr. Chinchilla. Lisinopril and Furosemide being held due to MISSY on CKD- (received 6 AM dose then discontinued).  -U/A and Urine Lytes ordered. 1/2 NS 30 cc/hr x 6 hours. Encourage PO hydration. MISSY on CKD Cr on admission 1.6 now Cr 2.7 with Hyperkalemia K+5.5 (no hemolysis). No Kayexalate at this time per Dr. Chinchilla. Lisinopril and Furosemide being held due to MISSY on CKD- (received 6 AM dose then discontinued). Likely secondary to dehydration as patient has not been eating much as well Lisinopril and Furosemide.  -U/A and Urine Lytes ordered. 1/2 NS 30 cc/hr x 6 hours. Encourage PO hydration.  -Diet changed to Renal. No Nephrology consult at this time.  -Monitor renal function, BP, Volume status and urine output.

## 2017-07-21 NOTE — PROGRESS NOTE ADULT - PROBLEM SELECTOR PLAN 3
EP Dr. Ruelas following  - Patient had 3 second pause at OSH while on cardizem gtt for which they dx SSS, however no further pauses once Cardizem drip discontinued. No indication for PPM.   - no significant pauses noted overnight on telemetry

## 2017-07-22 LAB
ANION GAP SERPL CALC-SCNC: 13 MMOL/L — SIGNIFICANT CHANGE UP (ref 5–17)
BUN SERPL-MCNC: 52 MG/DL — HIGH (ref 7–23)
CALCIUM SERPL-MCNC: 8.9 MG/DL — SIGNIFICANT CHANGE UP (ref 8.4–10.5)
CHLORIDE SERPL-SCNC: 104 MMOL/L — SIGNIFICANT CHANGE UP (ref 96–108)
CO2 SERPL-SCNC: 20 MMOL/L — LOW (ref 22–31)
CREAT SERPL-MCNC: 2.7 MG/DL — HIGH (ref 0.5–1.3)
GLUCOSE SERPL-MCNC: 98 MG/DL — SIGNIFICANT CHANGE UP (ref 70–99)
HCT VFR BLD CALC: 31.2 % — LOW (ref 34.5–45)
HGB BLD-MCNC: 10 G/DL — LOW (ref 11.5–15.5)
MAGNESIUM SERPL-MCNC: 2.1 MG/DL — SIGNIFICANT CHANGE UP (ref 1.6–2.6)
MCHC RBC-ENTMCNC: 30.8 PG — SIGNIFICANT CHANGE UP (ref 27–34)
MCHC RBC-ENTMCNC: 32.1 G/DL — SIGNIFICANT CHANGE UP (ref 32–36)
MCV RBC AUTO: 96 FL — SIGNIFICANT CHANGE UP (ref 80–100)
PHOSPHATE SERPL-MCNC: 5.5 MG/DL — HIGH (ref 2.5–4.5)
PLATELET # BLD AUTO: 143 K/UL — LOW (ref 150–400)
POTASSIUM SERPL-MCNC: 4.9 MMOL/L — SIGNIFICANT CHANGE UP (ref 3.5–5.3)
POTASSIUM SERPL-SCNC: 4.9 MMOL/L — SIGNIFICANT CHANGE UP (ref 3.5–5.3)
RBC # BLD: 3.25 M/UL — LOW (ref 3.8–5.2)
RBC # FLD: 12.9 % — SIGNIFICANT CHANGE UP (ref 10.3–16.9)
SODIUM SERPL-SCNC: 137 MMOL/L — SIGNIFICANT CHANGE UP (ref 135–145)
WBC # BLD: 4.1 K/UL — SIGNIFICANT CHANGE UP (ref 3.8–10.5)
WBC # FLD AUTO: 4.1 K/UL — SIGNIFICANT CHANGE UP (ref 3.8–10.5)

## 2017-07-22 PROCEDURE — 99233 SBSQ HOSP IP/OBS HIGH 50: CPT

## 2017-07-22 RX ORDER — METOPROLOL TARTRATE 50 MG
25 TABLET ORAL DAILY
Qty: 0 | Refills: 0 | Status: DISCONTINUED | OUTPATIENT
Start: 2017-07-23 | End: 2017-07-23

## 2017-07-22 RX ADMIN — CITALOPRAM 20 MILLIGRAM(S): 10 TABLET, FILM COATED ORAL at 12:35

## 2017-07-22 RX ADMIN — AMIODARONE HYDROCHLORIDE 400 MILLIGRAM(S): 400 TABLET ORAL at 10:22

## 2017-07-22 RX ADMIN — PREGABALIN 500 MICROGRAM(S): 225 CAPSULE ORAL at 12:36

## 2017-07-22 RX ADMIN — Medication 2000 UNIT(S): at 12:35

## 2017-07-22 RX ADMIN — APIXABAN 2.5 MILLIGRAM(S): 2.5 TABLET, FILM COATED ORAL at 21:51

## 2017-07-22 RX ADMIN — Medication 1 TABLET(S): at 18:42

## 2017-07-22 RX ADMIN — PANTOPRAZOLE SODIUM 40 MILLIGRAM(S): 20 TABLET, DELAYED RELEASE ORAL at 06:34

## 2017-07-22 RX ADMIN — Medication 75 MICROGRAM(S): at 06:34

## 2017-07-22 RX ADMIN — AMIODARONE HYDROCHLORIDE 400 MILLIGRAM(S): 400 TABLET ORAL at 21:51

## 2017-07-22 RX ADMIN — Medication 50 MILLIGRAM(S): at 06:34

## 2017-07-22 RX ADMIN — APIXABAN 2.5 MILLIGRAM(S): 2.5 TABLET, FILM COATED ORAL at 12:35

## 2017-07-22 RX ADMIN — SIMVASTATIN 5 MILLIGRAM(S): 20 TABLET, FILM COATED ORAL at 21:51

## 2017-07-22 NOTE — PROGRESS NOTE ADULT - ASSESSMENT
84yo F, former smoker, with PMHx HTN, Hyperlipidemia, Afib (on Eliquis) s/p failed DCCV in 1/2017 and hypothyroidism, CKD Stage IV (baseline Cr unknown) who presented to [facility] complaining of UNDERWOOD and was found to be in new acute systolic CHF exacerbation (EF 25% by echo there) in the setting of rapid Afib transferred to [facility] underwent diagnostic right and left cardiac cath revealing non-obstructive CAD and is s/p successful DCCV currently in NSR. Hospital course complicated by MISSY on CKD.

## 2017-07-22 NOTE — PROGRESS NOTE ADULT - SUBJECTIVE AND OBJECTIVE BOX
diagnosed systolic CHF (EF 25% at [facility]), hypothyroidism, newly diagnosed SSS no pacemaker presents to [facility] endorsing SOB with minimal exertion x 5 days. Denied chest pain, dizziness, palpitations, nausea. Pt was admitted for acute systolic CHF in the setting of afib with RVR. Pt was diuresed with IV Lasix. Pt now euvolemic. Pt was noted to have 2.8 second pause on telemetry and diagnosed with sick sinus syndrome. Pt had Echocardiogram which showed EF 25%. In light of pt's newly diagnosed systolic chf diagnosis and SSS pt transferred to [facility] on 7/1717 for further workup including formal Echocardiogram, right and left cardiac catheterization on 7/18/17 and EP evaluation.        Patient was seen and examined this AM      A little wheezy but she thinks its because she is "crunched up" in bed. Plan to get OOB and walk later. No dizziness, CP, SOB, palpitations.   ICU Vital Signs Last 24 Hrs  T(C): 35.8 (22 Jul 2017 09:02), Max: 36 (21 Jul 2017 21:00)  T(F): 96.5 (22 Jul 2017 09:02), Max: 96.8 (21 Jul 2017 21:00)  HR: 57 (22 Jul 2017 08:50) (50 - 57)  BP: 96/50 (22 Jul 2017 08:50) (96/50 - 110/55)  BP(mean): --  ABP: --  ABP(mean): --  RR: 18 (22 Jul 2017 08:50) (16 - 18)  SpO2: --    TELE: Sinus willi 50s bpm. No AFIB since cardioversion     PHYSICAL  General:  NAD        Chest:  mild right lung wheeze  Cardiac:  bradycardia, + s1/s2 , no murmur  Abdomen:  +BS , soft ND/NT  Extremities: No LE edema  Neuro: AAOX 3    LABS:                        10.0   4.1   )-----------( 143      ( 22 Jul 2017 07:16 )             31.2     07-22    137  |  104  |  52<H>  ----------------------------<  98  4.9   |  20<L>  |  2.70<H>    Ca    8.9      22 Jul 2017 07:16  Phos  5.5     07-22  Mg     2.1     07-22          MEDICATIONS:  calcium carbonate 1250 mG (OsCal) 1 Tablet(s) Oral daily  citalopram 20 milliGRAM(s) Oral daily  simvastatin 5 milliGRAM(s) Oral at bedtime  pantoprazole    Tablet 40 milliGRAM(s) Oral before breakfast  levothyroxine 75 MICROGram(s) Oral daily  cyanocobalamin 500 MICROGram(s) Oral daily  cholecalciferol 2000 Unit(s) Oral daily  apixaban 2.5 milliGRAM(s) Oral every 12 hours  amiodarone    Tablet 400 milliGRAM(s) Oral two times a day  sodium chloride 0.45%. 1000 milliLiter(s) IV Continuous <Continuous>

## 2017-07-22 NOTE — PROGRESS NOTE ADULT - PROBLEM SELECTOR PLAN 3
EP Dr. Ruelas following  - Patient had 3 second pause at OSH while on cardizem gtt for which they dx SSS, however no further pauses once Cardizem drip discontinued. No indication for PPM.   - no significant pauses noted overnight on telemetry - Continue Synthroid 75mcg daily. TFTs wnl.

## 2017-07-22 NOTE — PROGRESS NOTE ADULT - PROBLEM SELECTOR PLAN 4
MISSY in setting of post cath and over-diuresis.  Holding Lasix and ACEi / ARB  Creatinine plateauing today at 2.7.  Plan for d/c home once down trending Creatinine.

## 2017-07-22 NOTE — PROGRESS NOTE ADULT - PROBLEM SELECTOR PLAN 6
MISSY on CKD Cr on admission 1.6 now Cr 2.7 with Hyperkalemia K+5.5 (no hemolysis). No Kayexalate at this time per Dr. Chinchilla. Lisinopril and Furosemide being held due to MISSY on CKD- (received 6 AM dose then discontinued). Likely secondary to dehydration as patient has not been eating much as well Lisinopril and Furosemide.  -U/A and Urine Lytes ordered. 1/2 NS 30 cc/hr x 6 hours. Encourage PO hydration.  -Diet changed to Renal. No Nephrology consult at this time.  -Monitor renal function, BP, Volume status and urine output.

## 2017-07-22 NOTE — PROGRESS NOTE ADULT - ATTENDING COMMENTS
Agree with Progress Note, Subjective and Objective, Assessment and Plan
Agree with Progress Note, Subjective and Objective, Assessment and Plan
Agree with Progress Note, Subjective and Objective, Assessment and Plan.
Agree with Progress Note, Subjective and Objective, Assessment and Plan
Agree with Progress Note, Subjective and Objective, Assessment and Plan.

## 2017-07-22 NOTE — PROGRESS NOTE ADULT - SUBJECTIVE AND OBJECTIVE BOX
5 uris cardiology PA Progress Note    S: A little wheezy but she thinks its because she is "crunched up" in bed. Plan to get OOB and walk later. No dizziness, CP, SOB, palpitations.     O: T(C): 35.8 (07-22-17 @ 09:02), Max: 36 (07-21-17 @ 21:00)  HR: 57 (07-22-17 @ 08:50) (50 - 57)  BP: 96/50 (07-22-17 @ 08:50) (86/42 - 110/55)  RR: 18 (07-22-17 @ 08:50) (15 - 18)  SpO2:     TELE: Sinus willi 50s bpm. No AFIB since cardioversion     PHYSICAL  General:  NAD        Chest:  mild right lung wheeze  Cardiac:  bradycardia, + s1/s2 , no murmur  Abdomen:  +BS , soft ND/NT  Extremities: No LE edema    LABS:                        10.0   4.1   )-----------( 143      ( 22 Jul 2017 07:16 )             31.2     07-22    137  |  104  |  52<H>  ----------------------------<  98  4.9   |  20<L>  |  2.70<H>    Ca    8.9      22 Jul 2017 07:16  Phos  5.5     07-22  Mg     2.1     07-22          MEDICATIONS:  calcium carbonate 1250 mG (OsCal) 1 Tablet(s) Oral daily  citalopram 20 milliGRAM(s) Oral daily  simvastatin 5 milliGRAM(s) Oral at bedtime  pantoprazole    Tablet 40 milliGRAM(s) Oral before breakfast  levothyroxine 75 MICROGram(s) Oral daily  cyanocobalamin 500 MICROGram(s) Oral daily  cholecalciferol 2000 Unit(s) Oral daily  apixaban 2.5 milliGRAM(s) Oral every 12 hours  amiodarone    Tablet 400 milliGRAM(s) Oral two times a day  sodium chloride 0.45%. 1000 milliLiter(s) IV Continuous <Continuous> 5 uris cardiology PA Progress Note    S: A little wheezy but she thinks its because she is "crunched up" in bed. Plan to get OOB and walk later. No dizziness, CP, SOB, palpitations.     O: T(C): 35.8 (07-22-17 @ 09:02), Max: 36 (07-21-17 @ 21:00)  HR: 57 (07-22-17 @ 08:50) (50 - 57)  BP: 96/50 (07-22-17 @ 08:50) (86/42 - 110/55)  RR: 18 (07-22-17 @ 08:50) (15 - 18)  SpO2: 98% on RA    TELE: Sinus willi 50s bpm. No AFIB since cardioversion     PHYSICAL  General:  NAD        Chest:  mild right lung wheeze  Cardiac:  bradycardia, + s1/s2 , no murmur  Abdomen:  +BS , soft ND/NT  Extremities: No LE edema  Neuro: AAOX 3    LABS:                        10.0   4.1   )-----------( 143      ( 22 Jul 2017 07:16 )             31.2     07-22    137  |  104  |  52<H>  ----------------------------<  98  4.9   |  20<L>  |  2.70<H>    Ca    8.9      22 Jul 2017 07:16  Phos  5.5     07-22  Mg     2.1     07-22          MEDICATIONS:  calcium carbonate 1250 mG (OsCal) 1 Tablet(s) Oral daily  citalopram 20 milliGRAM(s) Oral daily  simvastatin 5 milliGRAM(s) Oral at bedtime  pantoprazole    Tablet 40 milliGRAM(s) Oral before breakfast  levothyroxine 75 MICROGram(s) Oral daily  cyanocobalamin 500 MICROGram(s) Oral daily  cholecalciferol 2000 Unit(s) Oral daily  apixaban 2.5 milliGRAM(s) Oral every 12 hours  amiodarone    Tablet 400 milliGRAM(s) Oral two times a day  sodium chloride 0.45%. 1000 milliLiter(s) IV Continuous <Continuous>

## 2017-07-22 NOTE — PROGRESS NOTE ADULT - PROBLEM SELECTOR PLAN 5
- SBP labile 's; continue Toprol XL 50 mg PO Daily.  Monitor BP closely. Given EF 20-25% SPB 90-100s expected.

## 2017-07-22 NOTE — PROGRESS NOTE ADULT - PROBLEM SELECTOR PLAN 1
- NICM likely tachycardia induced. Non obstructive CAD by diagnostic cardiac 7/18 with normal right heart pressures.   - EF 20-25%. Got diuresis but Lasix is on hold now due to MISSY.  Currently euvolemic.   - Lisinopril also on hold also due to MISSY.

## 2017-07-22 NOTE — PROGRESS NOTE ADULT - PROBLEM SELECTOR PLAN 4
- Continue Synthroid 75mcg daily. TFTs wnl. MISSY in setting of post cath and over-diuresis.  Holding Lasix and ACEi / ARB  Creatinine plateauing today at 2.7.  Plan for d/c home once down trending Creatinine.

## 2017-07-22 NOTE — PROGRESS NOTE ADULT - PROBLEM SELECTOR PLAN 2
- s/p successful DCCV 7/20/17.   - Sinus willi in the 50s.    - Will decrease Toprol to 25 mg daily starting 7/23, as she is also on Amiodarone and she is having mild wheezing today.  - Continue Eliquis for Anticoagulation.   - Amiodarone 400 mg BID (x 10 days until 7/29) then 200 mg PO Daily . LFTs normal. TSH normal on admission. No history of pulmonary disease.   - f/u with Dr. Ruelas on 8/28/17 at 1:40 PM here on 2nd floor.

## 2017-07-22 NOTE — PROGRESS NOTE ADULT - PROBLEM SELECTOR PLAN 7
- Pt takes Pravastatin 10mg and Zetia 10mg daily at home but simvastatin 5mg QD ordered here as home meds not on formulary; lipid panel wnl    DVT Prophylaxis: Eliquis  Dispo: Patient currently stable. Follow-up renal function in AM.

## 2017-07-22 NOTE — PROGRESS NOTE ADULT - PROBLEM SELECTOR PLAN 2
JESÚS Ruelas following  - s/p successful DCCV with 1 shock- patient currently in NSR with episodes of SB-HR 40-50s   - Continue reduced Toprol XL dose of 50 mg PO Daily.   -Continue Eliquis for Anti-coagulation.   - Amiodarone 400 mg BID (x 10 days until 7/29) then 200 mg PO Daily . LFTs normal. TSH normal on admission. No history of pulmonary disease.   -Magnesium 2.0. Hyperkalemia K+5.5. no hemolysis-no Kayexalate at this time per Dr. Chinchilla. - s/p successful DCCV 7/20/17.   - Sinus willi in the 50s.    - Will decrease Toprol to 25 mg daily starting 7/23, as she is also on Amiodarone and she is having mild wheezing today.  - Continue Eliquis for Anticoagulation.   - Amiodarone 400 mg BID (x 10 days until 7/29) then 200 mg PO Daily . LFTs normal. TSH normal on admission. No history of pulmonary disease.   - f/u with Dr. Ruelas on 8/28/17 at 1:40 PM here on 2nd floor.

## 2017-07-22 NOTE — PROGRESS NOTE ADULT - ASSESSMENT
84yo F, former smoker, with PMHx HTN, Hyperlipidemia, Afib (on Eliquis) s/p failed DCCV in 1/2017 and hypothyroidism, CKD Stage IV (baseline Cr unknown) who presented to [facility] complaining of UNDERWOOD and was found to be in new acute systolic CHF exacerbation (EF 25% by echo there) in the setting of rapid Afib transferred to [facility] underwent diagnostic right and left cardiac cath revealing non-obstructive CAD and is s/p successful DCCV currently in NSR. Hospital course complicated by MISSY on CKD.

## 2017-07-23 ENCOUNTER — TRANSCRIPTION ENCOUNTER (OUTPATIENT)
Age: 82
End: 2017-07-23

## 2017-07-23 VITALS — SYSTOLIC BLOOD PRESSURE: 113 MMHG | RESPIRATION RATE: 16 BRPM | DIASTOLIC BLOOD PRESSURE: 52 MMHG | HEART RATE: 49 BPM

## 2017-07-23 LAB
ANION GAP SERPL CALC-SCNC: 13 MMOL/L — SIGNIFICANT CHANGE UP (ref 5–17)
BUN SERPL-MCNC: 49 MG/DL — HIGH (ref 7–23)
CALCIUM SERPL-MCNC: 8.9 MG/DL — SIGNIFICANT CHANGE UP (ref 8.4–10.5)
CHLORIDE SERPL-SCNC: 107 MMOL/L — SIGNIFICANT CHANGE UP (ref 96–108)
CO2 SERPL-SCNC: 20 MMOL/L — LOW (ref 22–31)
CREAT SERPL-MCNC: 2.4 MG/DL — HIGH (ref 0.5–1.3)
GLUCOSE SERPL-MCNC: 103 MG/DL — HIGH (ref 70–99)
HCT VFR BLD CALC: 32.2 % — LOW (ref 34.5–45)
HGB BLD-MCNC: 10.5 G/DL — LOW (ref 11.5–15.5)
MAGNESIUM SERPL-MCNC: 2.1 MG/DL — SIGNIFICANT CHANGE UP (ref 1.6–2.6)
MCHC RBC-ENTMCNC: 31.4 PG — SIGNIFICANT CHANGE UP (ref 27–34)
MCHC RBC-ENTMCNC: 32.6 G/DL — SIGNIFICANT CHANGE UP (ref 32–36)
MCV RBC AUTO: 96.4 FL — SIGNIFICANT CHANGE UP (ref 80–100)
PLATELET # BLD AUTO: 156 K/UL — SIGNIFICANT CHANGE UP (ref 150–400)
POTASSIUM SERPL-MCNC: 4.8 MMOL/L — SIGNIFICANT CHANGE UP (ref 3.5–5.3)
POTASSIUM SERPL-SCNC: 4.8 MMOL/L — SIGNIFICANT CHANGE UP (ref 3.5–5.3)
RBC # BLD: 3.34 M/UL — LOW (ref 3.8–5.2)
RBC # FLD: 12.7 % — SIGNIFICANT CHANGE UP (ref 10.3–16.9)
SODIUM SERPL-SCNC: 140 MMOL/L — SIGNIFICANT CHANGE UP (ref 135–145)
WBC # BLD: 4.5 K/UL — SIGNIFICANT CHANGE UP (ref 3.8–10.5)
WBC # FLD AUTO: 4.5 K/UL — SIGNIFICANT CHANGE UP (ref 3.8–10.5)

## 2017-07-23 PROCEDURE — 85610 PROTHROMBIN TIME: CPT

## 2017-07-23 PROCEDURE — 85730 THROMBOPLASTIN TIME PARTIAL: CPT

## 2017-07-23 PROCEDURE — 80048 BASIC METABOLIC PNL TOTAL CA: CPT

## 2017-07-23 PROCEDURE — 82553 CREATINE MB FRACTION: CPT

## 2017-07-23 PROCEDURE — 93306 TTE W/DOPPLER COMPLETE: CPT

## 2017-07-23 PROCEDURE — C1889: CPT

## 2017-07-23 PROCEDURE — C1769: CPT

## 2017-07-23 PROCEDURE — 82550 ASSAY OF CK (CPK): CPT

## 2017-07-23 PROCEDURE — 83036 HEMOGLOBIN GLYCOSYLATED A1C: CPT

## 2017-07-23 PROCEDURE — 84100 ASSAY OF PHOSPHORUS: CPT

## 2017-07-23 PROCEDURE — C1894: CPT

## 2017-07-23 PROCEDURE — 36415 COLL VENOUS BLD VENIPUNCTURE: CPT

## 2017-07-23 PROCEDURE — 84484 ASSAY OF TROPONIN QUANT: CPT

## 2017-07-23 PROCEDURE — 84443 ASSAY THYROID STIM HORMONE: CPT

## 2017-07-23 PROCEDURE — 81001 URINALYSIS AUTO W/SCOPE: CPT

## 2017-07-23 PROCEDURE — 84436 ASSAY OF TOTAL THYROXINE: CPT

## 2017-07-23 PROCEDURE — 97161 PT EVAL LOW COMPLEX 20 MIN: CPT

## 2017-07-23 PROCEDURE — 84480 ASSAY TRIIODOTHYRONINE (T3): CPT

## 2017-07-23 PROCEDURE — 83735 ASSAY OF MAGNESIUM: CPT

## 2017-07-23 PROCEDURE — 84133 ASSAY OF URINE POTASSIUM: CPT

## 2017-07-23 PROCEDURE — 83935 ASSAY OF URINE OSMOLALITY: CPT

## 2017-07-23 PROCEDURE — 80061 LIPID PANEL: CPT

## 2017-07-23 PROCEDURE — C1760: CPT

## 2017-07-23 PROCEDURE — 97116 GAIT TRAINING THERAPY: CPT

## 2017-07-23 PROCEDURE — C1887: CPT

## 2017-07-23 PROCEDURE — 84300 ASSAY OF URINE SODIUM: CPT

## 2017-07-23 PROCEDURE — 84540 ASSAY OF URINE/UREA-N: CPT

## 2017-07-23 PROCEDURE — 82570 ASSAY OF URINE CREATININE: CPT

## 2017-07-23 PROCEDURE — 99239 HOSP IP/OBS DSCHRG MGMT >30: CPT

## 2017-07-23 PROCEDURE — 85027 COMPLETE CBC AUTOMATED: CPT

## 2017-07-23 RX ORDER — AMIODARONE HYDROCHLORIDE 400 MG/1
1 TABLET ORAL
Qty: 30 | Refills: 1 | OUTPATIENT
Start: 2017-07-23 | End: 2017-09-20

## 2017-07-23 RX ORDER — APIXABAN 2.5 MG/1
1 TABLET, FILM COATED ORAL
Qty: 60 | Refills: 1 | OUTPATIENT
Start: 2017-07-23 | End: 2017-09-20

## 2017-07-23 RX ORDER — APIXABAN 2.5 MG/1
1 TABLET, FILM COATED ORAL
Qty: 0 | Refills: 0 | COMMUNITY

## 2017-07-23 RX ORDER — METOPROLOL TARTRATE 50 MG
1 TABLET ORAL
Qty: 30 | Refills: 1 | OUTPATIENT
Start: 2017-07-23 | End: 2017-09-20

## 2017-07-23 RX ORDER — METOPROLOL TARTRATE 50 MG
1 TABLET ORAL
Qty: 0 | Refills: 0 | COMMUNITY

## 2017-07-23 RX ORDER — APIXABAN 2.5 MG/1
1 TABLET, FILM COATED ORAL
Qty: 0 | Refills: 0 | COMMUNITY
Start: 2017-07-23

## 2017-07-23 RX ORDER — AMIODARONE HYDROCHLORIDE 400 MG/1
1 TABLET ORAL
Qty: 14 | Refills: 0 | OUTPATIENT
Start: 2017-07-23 | End: 2017-07-30

## 2017-07-23 RX ORDER — AMIODARONE HYDROCHLORIDE 400 MG/1
2 TABLET ORAL
Qty: 28 | Refills: 0 | OUTPATIENT
Start: 2017-07-23 | End: 2017-07-30

## 2017-07-23 RX ADMIN — PANTOPRAZOLE SODIUM 40 MILLIGRAM(S): 20 TABLET, DELAYED RELEASE ORAL at 06:35

## 2017-07-23 RX ADMIN — CITALOPRAM 20 MILLIGRAM(S): 10 TABLET, FILM COATED ORAL at 13:05

## 2017-07-23 RX ADMIN — Medication 1 TABLET(S): at 13:05

## 2017-07-23 RX ADMIN — Medication 75 MICROGRAM(S): at 06:35

## 2017-07-23 RX ADMIN — AMIODARONE HYDROCHLORIDE 400 MILLIGRAM(S): 400 TABLET ORAL at 10:05

## 2017-07-23 RX ADMIN — Medication 2000 UNIT(S): at 13:05

## 2017-07-23 RX ADMIN — PREGABALIN 500 MICROGRAM(S): 225 CAPSULE ORAL at 13:05

## 2017-07-23 RX ADMIN — APIXABAN 2.5 MILLIGRAM(S): 2.5 TABLET, FILM COATED ORAL at 10:05

## 2017-07-23 NOTE — DISCHARGE NOTE ADULT - CARE PLAN
Principal Discharge DX:	Atrial fibrillation and flutter  Goal:	Remain in sinus rhythm.  Instructions for follow-up, activity and diet:	Continue taking Amiodarone loading dose of 400mg twice a day until Saturday evening (night dose). On Sunday 7/30/17, you will switch to 200mg once daily as a maintenance dose. You were written for two separate prescriptions for each dose.    Follow up with your MD, Dr. Acosta within 1 week.  Follow up with Dr. Ruelas on 8/28/17 at [facility] 2nd floor. Your appointment is scheduled for 1:40 PM.  Secondary Diagnosis:	Acute systolic congestive heart failure  Goal:	Improved breathing.  Instructions for follow-up, activity and diet:	Your heart failure was likely caused by your rapid and irregular rhythm. Now that your rhythm is normal, your heart is better able to pump blood and prevent water/fluid accumulating in your lungs.     Follow up with Dr. Acosta within 1 week.  Secondary Diagnosis:	CKD (chronic kidney disease) stage 4, GFR 15-29 ml/min  Goal:	Improvement in your creatinine level.  Instructions for follow-up, activity and diet:	Your kidneys were temporarily injured, but are now improving.    Follow up with Dr. Acosta within 1 week for a repeat lab check. Principal Discharge DX:	Atrial fibrillation and flutter  Goal:	Remain in sinus rhythm.  Instructions for follow-up, activity and diet:	Continue taking Amiodarone loading dose of 400mg twice a day until Saturday evening (night dose). On Sunday 7/30/17, you will switch to 200mg once daily as a maintenance dose. You were written for two separate prescriptions for each dose.    Follow up with your MD, Dr. Acosta within 1 week.  Follow up with Dr. Ruelas on 8/28/17 at [facility] 2nd floor. Your appointment is scheduled for 1:40 PM.  Secondary Diagnosis:	Acute systolic congestive heart failure  Goal:	Improved breathing.  Instructions for follow-up, activity and diet:	Your heart failure was likely caused by your rapid and irregular rhythm. Now that your rhythm is normal, your heart is better able to pump blood and prevent water/fluid accumulating in your lungs.     Follow up with Dr. Acosta within 1 week.  Secondary Diagnosis:	CKD (chronic kidney disease) stage 4, GFR 15-29 ml/min  Goal:	Improvement in your creatinine level.  Instructions for follow-up, activity and diet:	Your kidneys were temporarily injured, but are now improving.    Follow up with Dr. Acosta within 1 week for a repeat lab check. Principal Discharge DX:	Atrial fibrillation and flutter  Goal:	Remain in sinus rhythm.  Instructions for follow-up, activity and diet:	Continue taking Amiodarone loading dose of 400mg twice a day until Saturday evening (night dose). On Sunday 7/30/17, you will switch to 200mg once daily as a maintenance dose. You were written for two separate prescriptions for each dose.    Follow up with your MD, Dr. Acosta within 1 week.  Follow up with Dr. Ruelas on 8/28/17 at [facility] 2nd floor. Your appointment is scheduled for 1:40 PM.  Secondary Diagnosis:	Acute systolic congestive heart failure  Goal:	Improved breathing.  Instructions for follow-up, activity and diet:	Your heart failure was likely caused by your rapid and irregular rhythm. Now that your rhythm is normal, your heart is better able to pump blood and prevent water/fluid accumulating in your lungs.     Follow up with Dr. Acosta within 1 week.  Secondary Diagnosis:	CKD (chronic kidney disease) stage 4, GFR 15-29 ml/min  Goal:	Improvement in your creatinine level.  Instructions for follow-up, activity and diet:	Your kidneys were temporarily injured, but are now improving.    Follow up with Dr. Acosta within 1 week for a repeat lab check.

## 2017-07-23 NOTE — DISCHARGE NOTE ADULT - CARE PROVIDER_API CALL
Valerie Acosta), EndocrinologyMetabDiabetes; Internal Medicine  [address] [place] [id number]  Phone: [phone]  Fax: [phone]    Regino Ruelas), Cardiac Electrophysiology; Cardiovascular Disease; Internal Medicine  [address] [address]  Phone: [phone]  Fax: [phone]

## 2017-07-23 NOTE — DISCHARGE NOTE ADULT - MEDICATION SUMMARY - MEDICATIONS TO TAKE
I will START or STAY ON the medications listed below when I get home from the hospital:    calcium carbonate 400 mg oral tablet, chewable  -- 3 tab(s) by mouth once a day  -- Indication: For GERD    amiodarone 400 mg oral tablet  -- 1 tab(s) by mouth 2 times a day (last dose 7/29/17 evening), then switch to 200mg once a day.  -- Avoid grapefruit and grapefruit juice while taking this medication.  Avoid prolonged or excessive exposure to direct and/or artificial sunlight while taking this medication.  Do not take this drug if you are pregnant.  It is very important that you take or use this exactly as directed.  Do not skip doses or discontinue unless directed by your doctor.  May cause drowsiness or dizziness.    -- Indication: For Atrial Fibrillation/Flutter    amiodarone 200 mg oral tablet  -- 1 tab(s) by mouth once a day  -- Avoid grapefruit and grapefruit juice while taking this medication.  Avoid prolonged or excessive exposure to direct and/or artificial sunlight while taking this medication.  Do not take this drug if you are pregnant.  It is very important that you take or use this exactly as directed.  Do not skip doses or discontinue unless directed by your doctor.  May cause drowsiness or dizziness.    -- Indication: For Atrial Fibrillation/Flutter    apixaban 2.5 mg oral tablet  -- 1 tab(s) by mouth every 12 hours  -- Indication: For Atrial Fibrillation/Flutter    citalopram 20 mg oral tablet  -- 1 tab(s) by mouth once a day  -- Indication: For Anxiety/Depression    Zetia 10 mg oral tablet  -- 1 tab(s) by mouth once a day  -- Indication: For Cholesterol    pravastatin 10 mg oral tablet  -- 1 tab(s) by mouth once a day  -- Indication: For Cholesterol    metoprolol succinate 25 mg oral tablet, extended release  -- 1 tab(s) by mouth once a day  -- Indication: For Atrial Fibrillation/Flutter    omeprazole 20 mg oral delayed release capsule  -- 1 cap(s) by mouth once a day  -- Indication: For GERD    levothyroxine 75 mcg (0.075 mg) oral tablet  -- 1 tab(s) by mouth once a day  -- Indication: For Hypothyroidism    Vitamin D3 1000 intl units oral tablet  -- 2 tab(s) by mouth once a day  -- Indication: For Supplement    Vitamin B12 500 mcg oral tablet  -- 1 tab(s) by mouth once a day  -- Indication: For Supplement I will START or STAY ON the medications listed below when I get home from the hospital:    calcium carbonate 400 mg oral tablet, chewable  -- 3 tab(s) by mouth once a day  -- Indication: For GERD    amiodarone 200 mg oral tablet  -- 1 tab(s) by mouth once a day  -- Avoid grapefruit and grapefruit juice while taking this medication.  Avoid prolonged or excessive exposure to direct and/or artificial sunlight while taking this medication.  Do not take this drug if you are pregnant.  It is very important that you take or use this exactly as directed.  Do not skip doses or discontinue unless directed by your doctor.  May cause drowsiness or dizziness.    -- Indication: For Atrial fibrillation and flutter    amiodarone 200 mg oral tablet  -- 1 tab(s) by mouth once a day (START AFTER YOU COMPLETE THE 400mg tablets)  -- Avoid grapefruit and grapefruit juice while taking this medication.  Avoid prolonged or excessive exposure to direct and/or artificial sunlight while taking this medication.  Do not take this drug if you are pregnant.  It is very important that you take or use this exactly as directed.  Do not skip doses or discontinue unless directed by your doctor.  May cause drowsiness or dizziness.    -- Indication: For Atrial fibrillation and flutter    apixaban 2.5 mg oral tablet  -- 1 tab(s) by mouth 2 times a day  -- Check with your doctor before becoming pregnant.  It is very important that you take or use this exactly as directed.  Do not skip doses or discontinue unless directed by your doctor.  Obtain medical advice before taking any non-prescription drugs as some may affect the action of this medication.    -- Indication: For Atrial fibrillation and flutter    citalopram 20 mg oral tablet  -- 1 tab(s) by mouth once a day  -- Indication: For Anxiety/Depression    Zetia 10 mg oral tablet  -- 1 tab(s) by mouth once a day  -- Indication: For Cholesterol    pravastatin 10 mg oral tablet  -- 1 tab(s) by mouth once a day  -- Indication: For Cholesterol    Toprol-XL 25 mg oral tablet, extended release  -- 1 tab(s) by mouth once a day  -- It is very important that you take or use this exactly as directed.  Do not skip doses or discontinue unless directed by your doctor.  May cause drowsiness.  Alcohol may intensify this effect.  Use care when operating dangerous machinery.  Some non-prescription drugs may aggravate your condition.  Read all labels carefully.  If a warning appears, check with your doctor before taking.  Swallow whole.  Do not crush.  Take with food or milk.  This drug may impair the ability to drive or operate machinery.  Use care until you become familiar with its effects.    -- Indication: For Atrial fibrillation and flutter    omeprazole 20 mg oral delayed release capsule  -- 1 cap(s) by mouth once a day  -- Indication: For GERD    levothyroxine 75 mcg (0.075 mg) oral tablet  -- 1 tab(s) by mouth once a day  -- Indication: For Hypothyroidism    Vitamin D3 1000 intl units oral tablet  -- 2 tab(s) by mouth once a day  -- Indication: For Supplement    Vitamin B12 500 mcg oral tablet  -- 1 tab(s) by mouth once a day  -- Indication: For Supplement

## 2017-07-23 NOTE — DISCHARGE NOTE ADULT - HOSPITAL COURSE
84 y/o admitted for new onset systolic CHF exacerbation in the setting of rapid A Fib. Pt underwent cardiac cath revealing non obs CAD. Pt is now s/p DCCV currently in SR. Patient's course was complicated by MISSY which is now trending down. Pt deemed stable for discharge per Dr. Chinchilla.

## 2017-07-23 NOTE — DISCHARGE NOTE ADULT - PLAN OF CARE
Remain in sinus rhythm. Continue taking Amiodarone loading dose of 400mg twice a day until Saturday evening (night dose). On Sunday 7/30/17, you will switch to 200mg once daily as a maintenance dose. You were written for two separate prescriptions for each dose.    Follow up with your MD, Dr. Acosta within 1 week.  Follow up with Dr. Ruelas on 8/28/17 at [facility] 2nd floor. Your appointment is scheduled for 1:40 PM. Improved breathing. Your heart failure was likely caused by your rapid and irregular rhythm. Now that your rhythm is normal, your heart is better able to pump blood and prevent water/fluid accumulating in your lungs.     Follow up with Dr. Acosta within 1 week. Improvement in your creatinine level. Your kidneys were temporarily injured, but are now improving.    Follow up with Dr. Acosta within 1 week for a repeat lab check.

## 2017-07-23 NOTE — DISCHARGE NOTE ADULT - PATIENT PORTAL LINK FT
“You can access the [facility] Patient Portal, offered by [facility], by registering with the following website: [email or web address]”

## 2017-07-23 NOTE — DISCHARGE NOTE ADULT - MEDICATION SUMMARY - MEDICATIONS TO CHANGE
I will SWITCH the dose or number of times a day I take the medications listed below when I get home from the hospital:    Metoprolol Succinate  mg oral tablet, extended release  -- 1 tab(s) by mouth once a day

## 2017-07-26 DIAGNOSIS — E78.5 HYPERLIPIDEMIA, UNSPECIFIED: ICD-10-CM

## 2017-07-26 DIAGNOSIS — K21.9 GASTRO-ESOPHAGEAL REFLUX DISEASE WITHOUT ESOPHAGITIS: ICD-10-CM

## 2017-07-26 DIAGNOSIS — E66.9 OBESITY, UNSPECIFIED: ICD-10-CM

## 2017-07-26 DIAGNOSIS — E87.5 HYPERKALEMIA: ICD-10-CM

## 2017-07-26 DIAGNOSIS — I25.10 ATHEROSCLEROTIC HEART DISEASE OF NATIVE CORONARY ARTERY WITHOUT ANGINA PECTORIS: ICD-10-CM

## 2017-07-26 DIAGNOSIS — I49.5 SICK SINUS SYNDROME: ICD-10-CM

## 2017-07-26 DIAGNOSIS — I42.8 OTHER CARDIOMYOPATHIES: ICD-10-CM

## 2017-07-26 DIAGNOSIS — N17.9 ACUTE KIDNEY FAILURE, UNSPECIFIED: ICD-10-CM

## 2017-07-26 DIAGNOSIS — I48.1 PERSISTENT ATRIAL FIBRILLATION: ICD-10-CM

## 2017-07-26 DIAGNOSIS — Z87.891 PERSONAL HISTORY OF NICOTINE DEPENDENCE: ICD-10-CM

## 2017-07-26 DIAGNOSIS — I48.92 UNSPECIFIED ATRIAL FLUTTER: ICD-10-CM

## 2017-07-26 DIAGNOSIS — I13.0 HYPERTENSIVE HEART AND CHRONIC KIDNEY DISEASE WITH HEART FAILURE AND STAGE 1 THROUGH STAGE 4 CHRONIC KIDNEY DISEASE, OR UNSPECIFIED CHRONIC KIDNEY DISEASE: ICD-10-CM

## 2017-07-26 DIAGNOSIS — N18.4 CHRONIC KIDNEY DISEASE, STAGE 4 (SEVERE): ICD-10-CM

## 2017-07-26 DIAGNOSIS — Z79.01 LONG TERM (CURRENT) USE OF ANTICOAGULANTS: ICD-10-CM

## 2017-07-26 DIAGNOSIS — E03.9 HYPOTHYROIDISM, UNSPECIFIED: ICD-10-CM

## 2017-07-26 DIAGNOSIS — I50.21 ACUTE SYSTOLIC (CONGESTIVE) HEART FAILURE: ICD-10-CM

## 2017-08-28 ENCOUNTER — APPOINTMENT (OUTPATIENT)
Dept: HEART AND VASCULAR | Facility: CLINIC | Age: 82
End: 2017-08-28

## 2020-11-16 NOTE — DISCHARGE NOTE ADULT - ADMISSION DATE +STARTOFVISITDATE
From: Rebekah Moreno  To: Alon Gonsales  Sent: 11/16/2020 10:28 AM CST  Subject: Lab Test or Test Related Question    Hi there,    I just wanted to let you know I just got done with labs and x-ray. Not sure if anything will be available to you today to review but I've completed this.    As a side note yesterday was about the worst day of pain and bloating. It's seems to have subsided this a.m.     Thank you. Rebekah Moreno   Statement Selected

## 2024-11-02 NOTE — PROGRESS NOTE ADULT - PROBLEM/PLAN-5
Problem: Chronic Conditions and Co-morbidities  Goal: Patient's chronic conditions and co-morbidity symptoms are monitored and maintained or improved  11/2/2024 1311 by Ida Finley RN  Outcome: Progressing  11/2/2024 0019 by Marilyn Quinones RN  Outcome: Progressing     Problem: Discharge Planning  Goal: Discharge to home or other facility with appropriate resources  11/2/2024 1311 by Ida Finley RN  Outcome: Progressing  11/2/2024 0019 by Marilyn Quinones RN  Outcome: Progressing     Problem: Safety - Adult  Goal: Free from fall injury  11/2/2024 1311 by Ida Finley RN  Outcome: Progressing  11/2/2024 0019 by Marilyn Quinones RN  Outcome: Progressing     Problem: Skin/Tissue Integrity  Goal: Absence of new skin breakdown  Description: 1.  Monitor for areas of redness and/or skin breakdown  2.  Assess vascular access sites hourly  3.  Every 4-6 hours minimum:  Change oxygen saturation probe site  4.  Every 4-6 hours:  If on nasal continuous positive airway pressure, respiratory therapy assess nares and determine need for appliance change or resting period.  Outcome: Progressing     Problem: Neurosensory - Adult  Goal: Achieves stable or improved neurological status  11/2/2024 1311 by Ida Finley RN  Outcome: Progressing  11/2/2024 0019 by Marilyn Quinones RN  Outcome: Progressing  Goal: Absence of seizures  11/2/2024 1311 by Ida Finley RN  Outcome: Progressing  11/2/2024 0019 by Marilyn Quinones RN  Outcome: Progressing  Goal: Remains free of injury related to seizures activity  11/2/2024 1311 by Ida Finley RN  Outcome: Progressing  11/2/2024 0019 by Marilyn Quinones RN  Outcome: Progressing  Goal: Achieves maximal functionality and self care  11/2/2024 1311 by Ida Finley RN  Outcome: Progressing  11/2/2024 0019 by Marilyn Quinones RN  Outcome: Progressing     Problem: Skin/Tissue Integrity - Adult  Goal:  DISPLAY PLAN FREE TEXT